# Patient Record
Sex: FEMALE | URBAN - METROPOLITAN AREA
[De-identification: names, ages, dates, MRNs, and addresses within clinical notes are randomized per-mention and may not be internally consistent; named-entity substitution may affect disease eponyms.]

---

## 2022-06-13 ENCOUNTER — EMPLOYEE HEALTH (OUTPATIENT)
Dept: OTHER | Facility: HOSPITAL | Age: 32
End: 2022-06-13
Attending: PREVENTIVE MEDICINE

## 2022-06-13 DIAGNOSIS — Z11.1 SCREENING-PULMONARY TB: Primary | ICD-10-CM

## 2022-06-13 PROCEDURE — 86480 TB TEST CELL IMMUN MEASURE: CPT

## 2022-06-15 LAB
M TB IFN-G CD4+ T-CELLS BLD-ACNC: 0.02 IU/ML
M TB TUBERC IFN-G BLD QL: NEGATIVE
M TB TUBERC IGNF/MITOGEN IGNF CONTROL: >10 IU/ML
QFT TB1 AG MINUS NIL: 0 IU/ML
QFT TB2 AG MINUS NIL: 0.01 IU/ML

## 2022-10-14 ENCOUNTER — OFFICE VISIT (OUTPATIENT)
Dept: OTHER | Facility: HOSPITAL | Age: 32
End: 2022-10-14
Attending: PREVENTIVE MEDICINE
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
## 2022-10-14 LAB — HCV AB SERPL QL IA: NONREACTIVE

## 2022-10-14 PROCEDURE — 86803 HEPATITIS C AB TEST: CPT

## 2022-10-14 PROCEDURE — 87389 HIV-1 AG W/HIV-1&-2 AB AG IA: CPT

## 2022-12-01 ENCOUNTER — OFFICE VISIT (OUTPATIENT)
Dept: OTHER | Facility: HOSPITAL | Age: 32
End: 2022-12-01
Attending: PREVENTIVE MEDICINE

## 2022-12-01 DIAGNOSIS — Z77.21 EXPOSURE TO BLOOD-BORNE PATHOGEN: Primary | ICD-10-CM

## 2022-12-01 PROCEDURE — 87389 HIV-1 AG W/HIV-1&-2 AB AG IA: CPT

## 2022-12-12 ENCOUNTER — OFFICE VISIT (OUTPATIENT)
Dept: FAMILY MEDICINE CLINIC | Facility: CLINIC | Age: 32
End: 2022-12-12
Payer: COMMERCIAL

## 2022-12-12 VITALS
HEART RATE: 76 BPM | TEMPERATURE: 98 F | HEIGHT: 61 IN | WEIGHT: 125.38 LBS | SYSTOLIC BLOOD PRESSURE: 112 MMHG | RESPIRATION RATE: 14 BRPM | BODY MASS INDEX: 23.67 KG/M2 | DIASTOLIC BLOOD PRESSURE: 78 MMHG

## 2022-12-12 DIAGNOSIS — Z13.89 SCREENING FOR GENITOURINARY CONDITION: ICD-10-CM

## 2022-12-12 DIAGNOSIS — R22.31 LUMP IN ARMPIT, RIGHT: ICD-10-CM

## 2022-12-12 DIAGNOSIS — Z00.00 PHYSICAL EXAM, ANNUAL: Primary | ICD-10-CM

## 2022-12-12 DIAGNOSIS — Z00.00 LABORATORY EXAMINATION ORDERED AS PART OF A ROUTINE GENERAL MEDICAL EXAMINATION: ICD-10-CM

## 2022-12-12 PROCEDURE — 99385 PREV VISIT NEW AGE 18-39: CPT | Performed by: FAMILY MEDICINE

## 2022-12-12 PROCEDURE — 3078F DIAST BP <80 MM HG: CPT | Performed by: FAMILY MEDICINE

## 2022-12-12 PROCEDURE — 3074F SYST BP LT 130 MM HG: CPT | Performed by: FAMILY MEDICINE

## 2022-12-12 PROCEDURE — 3008F BODY MASS INDEX DOCD: CPT | Performed by: FAMILY MEDICINE

## 2022-12-12 NOTE — PATIENT INSTRUCTIONS
Schedule ultrasound of the armpit. Call [phone] to schedule fasting blood work. Healthy diet. Stay active. Forward  your Pap smear results and immunization records.

## 2022-12-20 ENCOUNTER — LAB ENCOUNTER (OUTPATIENT)
Dept: LAB | Age: 32
End: 2022-12-20
Attending: FAMILY MEDICINE
Payer: COMMERCIAL

## 2022-12-20 DIAGNOSIS — Z13.89 SCREENING FOR GENITOURINARY CONDITION: ICD-10-CM

## 2022-12-20 DIAGNOSIS — Z00.00 LABORATORY EXAMINATION ORDERED AS PART OF A ROUTINE GENERAL MEDICAL EXAMINATION: ICD-10-CM

## 2022-12-20 LAB
ALBUMIN SERPL-MCNC: 3.9 G/DL (ref 3.4–5)
ALBUMIN/GLOB SERPL: 1.1 {RATIO} (ref 1–2)
ALP LIVER SERPL-CCNC: 67 U/L
ALT SERPL-CCNC: 16 U/L
ANION GAP SERPL CALC-SCNC: 4 MMOL/L (ref 0–18)
AST SERPL-CCNC: 10 U/L (ref 15–37)
BASOPHILS # BLD AUTO: 0.04 X10(3) UL (ref 0–0.2)
BASOPHILS NFR BLD AUTO: 0.3 %
BILIRUB SERPL-MCNC: 0.4 MG/DL (ref 0.1–2)
BILIRUB UR QL: NEGATIVE
BUN BLD-MCNC: 13 MG/DL (ref 7–18)
BUN/CREAT SERPL: 21.7 (ref 10–20)
CALCIUM BLD-MCNC: 8.7 MG/DL (ref 8.5–10.1)
CHLORIDE SERPL-SCNC: 109 MMOL/L (ref 98–112)
CHOLEST SERPL-MCNC: 206 MG/DL (ref ?–200)
CLARITY UR: CLEAR
CO2 SERPL-SCNC: 26 MMOL/L (ref 21–32)
COLOR UR: YELLOW
CREAT BLD-MCNC: 0.6 MG/DL
DEPRECATED RDW RBC AUTO: 41.7 FL (ref 35.1–46.3)
EOSINOPHIL # BLD AUTO: 0.08 X10(3) UL (ref 0–0.7)
EOSINOPHIL NFR BLD AUTO: 0.6 %
ERYTHROCYTE [DISTWIDTH] IN BLOOD BY AUTOMATED COUNT: 13.4 % (ref 11–15)
FASTING PATIENT LIPID ANSWER: YES
FASTING STATUS PATIENT QL REPORTED: YES
GFR SERPLBLD BASED ON 1.73 SQ M-ARVRAT: 121 ML/MIN/1.73M2 (ref 60–?)
GLOBULIN PLAS-MCNC: 3.6 G/DL (ref 2.8–4.4)
GLUCOSE BLD-MCNC: 88 MG/DL (ref 70–99)
GLUCOSE UR-MCNC: NEGATIVE MG/DL
HCT VFR BLD AUTO: 42.2 %
HDLC SERPL-MCNC: 52 MG/DL (ref 40–59)
HGB BLD-MCNC: 12.9 G/DL
IMM GRANULOCYTES # BLD AUTO: 0.06 X10(3) UL (ref 0–1)
IMM GRANULOCYTES NFR BLD: 0.5 %
KETONES UR-MCNC: 15 MG/DL
LDLC SERPL CALC-MCNC: 139 MG/DL (ref ?–100)
LEUKOCYTE ESTERASE UR QL STRIP.AUTO: NEGATIVE
LYMPHOCYTES # BLD AUTO: 1.56 X10(3) UL (ref 1–4)
LYMPHOCYTES NFR BLD AUTO: 12.4 %
MCH RBC QN AUTO: 26.4 PG (ref 26–34)
MCHC RBC AUTO-ENTMCNC: 30.6 G/DL (ref 31–37)
MCV RBC AUTO: 86.5 FL
MONOCYTES # BLD AUTO: 0.66 X10(3) UL (ref 0.1–1)
MONOCYTES NFR BLD AUTO: 5.2 %
NEUTROPHILS # BLD AUTO: 10.2 X10 (3) UL (ref 1.5–7.7)
NEUTROPHILS # BLD AUTO: 10.2 X10(3) UL (ref 1.5–7.7)
NEUTROPHILS NFR BLD AUTO: 81 %
NITRITE UR QL STRIP.AUTO: NEGATIVE
NONHDLC SERPL-MCNC: 154 MG/DL (ref ?–130)
OSMOLALITY SERPL CALC.SUM OF ELEC: 288 MOSM/KG (ref 275–295)
PH UR: 5 [PH] (ref 5–8)
PLATELET # BLD AUTO: 216 10(3)UL (ref 150–450)
POTASSIUM SERPL-SCNC: 4.5 MMOL/L (ref 3.5–5.1)
PROT SERPL-MCNC: 7.5 G/DL (ref 6.4–8.2)
PROT UR-MCNC: NEGATIVE MG/DL
RBC # BLD AUTO: 4.88 X10(6)UL
SODIUM SERPL-SCNC: 139 MMOL/L (ref 136–145)
SP GR UR STRIP: 1.02 (ref 1–1.03)
TRIGL SERPL-MCNC: 86 MG/DL (ref 30–149)
TSI SER-ACNC: 1.13 MIU/ML (ref 0.36–3.74)
UROBILINOGEN UR STRIP-ACNC: 0.2
VLDLC SERPL CALC-MCNC: 16 MG/DL (ref 0–30)
WBC # BLD AUTO: 12.6 X10(3) UL (ref 4–11)

## 2022-12-20 PROCEDURE — 80053 COMPREHEN METABOLIC PANEL: CPT

## 2022-12-20 PROCEDURE — 84443 ASSAY THYROID STIM HORMONE: CPT

## 2022-12-20 PROCEDURE — 80061 LIPID PANEL: CPT

## 2022-12-20 PROCEDURE — 81001 URINALYSIS AUTO W/SCOPE: CPT

## 2022-12-20 PROCEDURE — 85025 COMPLETE CBC W/AUTO DIFF WBC: CPT

## 2022-12-20 PROCEDURE — 81015 MICROSCOPIC EXAM OF URINE: CPT

## 2022-12-21 DIAGNOSIS — D72.829 LEUKOCYTOSIS, UNSPECIFIED TYPE: Primary | ICD-10-CM

## 2022-12-21 DIAGNOSIS — R31.9 HEMATURIA, UNSPECIFIED TYPE: Primary | ICD-10-CM

## 2022-12-29 ENCOUNTER — TELEPHONE (OUTPATIENT)
Dept: FAMILY MEDICINE CLINIC | Facility: CLINIC | Age: 32
End: 2022-12-29

## 2023-01-05 ENCOUNTER — PATIENT MESSAGE (OUTPATIENT)
Dept: FAMILY MEDICINE CLINIC | Facility: CLINIC | Age: 33
End: 2023-01-05

## 2023-01-05 DIAGNOSIS — R22.31 LUMP IN ARMPIT, RIGHT: Primary | ICD-10-CM

## 2023-01-06 NOTE — TELEPHONE ENCOUNTER
From: Stacy Monsalve  To: Davon Renteria MD  Sent: 1/5/2023 10:07 AM CST  Subject: [address] Dr Marija Wayne! I called to schedule for an Ultrasound armpit but I was told by the clinic that I will have to do Diagnostic Mammogram and Breast Ultrasound instead due to the location of the nodule that I felt on my right armpit. May I please request for a new order of Diagnostic Mammogram and Ultrasound breast (RIGHT) so I can proceed to make the appointment? Thank you!      April

## 2023-01-12 ENCOUNTER — LAB ENCOUNTER (OUTPATIENT)
Dept: LAB | Age: 33
End: 2023-01-12
Attending: FAMILY MEDICINE
Payer: COMMERCIAL

## 2023-01-12 DIAGNOSIS — R31.21 ASYMPTOMATIC MICROSCOPIC HEMATURIA: Primary | ICD-10-CM

## 2023-01-12 DIAGNOSIS — D72.829 LEUKOCYTOSIS, UNSPECIFIED TYPE: ICD-10-CM

## 2023-01-12 DIAGNOSIS — R31.9 HEMATURIA, UNSPECIFIED TYPE: ICD-10-CM

## 2023-01-12 LAB
BASOPHILS # BLD AUTO: 0.02 X10(3) UL (ref 0–0.2)
BASOPHILS NFR BLD AUTO: 0.3 %
BILIRUB UR QL: NEGATIVE
CLARITY UR: CLEAR
COLOR UR: YELLOW
DEPRECATED RDW RBC AUTO: 40.1 FL (ref 35.1–46.3)
EOSINOPHIL # BLD AUTO: 0.08 X10(3) UL (ref 0–0.7)
EOSINOPHIL NFR BLD AUTO: 1 %
ERYTHROCYTE [DISTWIDTH] IN BLOOD BY AUTOMATED COUNT: 13.1 % (ref 11–15)
GLUCOSE UR-MCNC: NEGATIVE MG/DL
HCT VFR BLD AUTO: 42.5 %
HGB BLD-MCNC: 13.6 G/DL
HYALINE CASTS #/AREA URNS AUTO: PRESENT /LPF
HYALINE CASTS #/AREA URNS AUTO: PRESENT /LPF
IMM GRANULOCYTES # BLD AUTO: 0.03 X10(3) UL (ref 0–1)
IMM GRANULOCYTES NFR BLD: 0.4 %
KETONES UR-MCNC: 15 MG/DL
LEUKOCYTE ESTERASE UR QL STRIP.AUTO: NEGATIVE
LYMPHOCYTES # BLD AUTO: 1.8 X10(3) UL (ref 1–4)
LYMPHOCYTES NFR BLD AUTO: 23.5 %
MCH RBC QN AUTO: 26.6 PG (ref 26–34)
MCHC RBC AUTO-ENTMCNC: 32 G/DL (ref 31–37)
MCV RBC AUTO: 83.2 FL
MONOCYTES # BLD AUTO: 0.49 X10(3) UL (ref 0.1–1)
MONOCYTES NFR BLD AUTO: 6.4 %
NEUTROPHILS # BLD AUTO: 5.23 X10 (3) UL (ref 1.5–7.7)
NEUTROPHILS # BLD AUTO: 5.23 X10(3) UL (ref 1.5–7.7)
NEUTROPHILS NFR BLD AUTO: 68.4 %
NITRITE UR QL STRIP.AUTO: NEGATIVE
PH UR: 5.5 [PH] (ref 5–8)
PLATELET # BLD AUTO: 361 10(3)UL (ref 150–450)
PROT UR-MCNC: NEGATIVE MG/DL
RBC # BLD AUTO: 5.11 X10(6)UL
SP GR UR STRIP: 1.01 (ref 1–1.03)
UROBILINOGEN UR STRIP-ACNC: 0.2
WBC # BLD AUTO: 7.7 X10(3) UL (ref 4–11)

## 2023-01-12 PROCEDURE — 85025 COMPLETE CBC W/AUTO DIFF WBC: CPT

## 2023-01-12 PROCEDURE — 81015 MICROSCOPIC EXAM OF URINE: CPT

## 2023-01-12 PROCEDURE — 81001 URINALYSIS AUTO W/SCOPE: CPT

## 2023-01-19 ENCOUNTER — OFFICE VISIT (OUTPATIENT)
Dept: OTHER | Facility: HOSPITAL | Age: 33
End: 2023-01-19
Attending: PREVENTIVE MEDICINE

## 2023-01-19 DIAGNOSIS — Z02.89 VISIT FOR OCCUPATIONAL HEALTH EXAMINATION: Primary | ICD-10-CM

## 2023-01-19 LAB — HCV AB SERPL QL IA: NONREACTIVE

## 2023-01-19 PROCEDURE — 86803 HEPATITIS C AB TEST: CPT

## 2023-01-20 ENCOUNTER — PATIENT MESSAGE (OUTPATIENT)
Dept: FAMILY MEDICINE CLINIC | Facility: CLINIC | Age: 33
End: 2023-01-20

## 2023-01-20 NOTE — TELEPHONE ENCOUNTER
From: Stacy Zheng Apo  To: Mert Osorio MD  Sent: 1/20/2023 8:12 AM CST  Subject: Prescribe Medication     Hi Dr Santy Rosado! Im just wondering if you will be able to prescribe me an Isotretenoin Medication? I used to take this medication to control my acne from my Derm Doctor from [facility]. But since I moved here in [facility], I have not seen a dermatologist yet and will only have my 1st appt this April 5th. Will you be able to prescribe for me while waiting for my derm appointment? I take Oratane for 5mg every other day. If not, i would totally understand.      Thank you Dr!     April

## 2023-02-21 ENCOUNTER — TELEPHONE (OUTPATIENT)
Dept: FAMILY MEDICINE CLINIC | Facility: CLINIC | Age: 33
End: 2023-02-21

## 2023-02-21 ENCOUNTER — MED REC SCAN ONLY (OUTPATIENT)
Dept: FAMILY MEDICINE CLINIC | Facility: CLINIC | Age: 33
End: 2023-02-21

## 2023-02-21 NOTE — TELEPHONE ENCOUNTER
RAFFAELE. to pt. I notified her of the results of the US of the right axilla. Pt was instructed to do self exams every couple of weeks. Make an appt to follow up in June/July.  Pt. Agreed to plan and verbalized understanding

## 2023-02-21 NOTE — TELEPHONE ENCOUNTER
I have received patient ultrasound of the right axilla results back. It shows normal-appearing lymph node. Continue monitoring  by self-examination once every couple of weeks. We will recheck it again at patient's next visit in the office. I would suggest to set up follow-up appointment in June/July  . Thanks.     (. Results came back as a paper copy from [address] [place] [address].)

## 2023-02-24 ENCOUNTER — OFFICE VISIT (OUTPATIENT)
Dept: OCCUPATIONAL MEDICINE | Age: 33
End: 2023-02-24
Attending: PHYSICIAN ASSISTANT

## 2023-02-24 DIAGNOSIS — Z77.21 EXPOSURE TO BLOOD-BORNE PATHOGEN: Primary | ICD-10-CM

## 2023-02-24 PROCEDURE — 87389 HIV-1 AG W/HIV-1&-2 AB AG IA: CPT

## 2023-08-29 ENCOUNTER — OFFICE VISIT (OUTPATIENT)
Dept: OTHER | Facility: HOSPITAL | Age: 33
End: 2023-08-29
Attending: PREVENTIVE MEDICINE

## 2023-08-29 DIAGNOSIS — Z02.89 VISIT FOR OCCUPATIONAL HEALTH EXAMINATION: Primary | ICD-10-CM

## 2023-08-29 LAB
HBV SURFACE AB SER QL: NONREACTIVE
HBV SURFACE AB SERPL IA-ACNC: <3.1 MIU/ML
HCV AB SERPL QL IA: NONREACTIVE

## 2023-08-29 PROCEDURE — 86706 HEP B SURFACE ANTIBODY: CPT

## 2023-08-29 PROCEDURE — 87389 HIV-1 AG W/HIV-1&-2 AB AG IA: CPT

## 2023-08-29 PROCEDURE — 86803 HEPATITIS C AB TEST: CPT

## 2023-09-27 ENCOUNTER — OFFICE VISIT (OUTPATIENT)
Dept: OBGYN CLINIC | Facility: CLINIC | Age: 33
End: 2023-09-27
Payer: COMMERCIAL

## 2023-09-27 VITALS
HEIGHT: 61 IN | SYSTOLIC BLOOD PRESSURE: 133 MMHG | DIASTOLIC BLOOD PRESSURE: 83 MMHG | HEART RATE: 78 BPM | BODY MASS INDEX: 23.98 KG/M2 | WEIGHT: 127 LBS

## 2023-09-27 DIAGNOSIS — Z01.419 ENCOUNTER FOR ANNUAL ROUTINE GYNECOLOGICAL EXAMINATION: Primary | ICD-10-CM

## 2023-09-27 DIAGNOSIS — N92.1 PROLONGED MENSTRUAL CYCLE: ICD-10-CM

## 2023-09-27 PROCEDURE — 88175 CYTOPATH C/V AUTO FLUID REDO: CPT | Performed by: STUDENT IN AN ORGANIZED HEALTH CARE EDUCATION/TRAINING PROGRAM

## 2023-09-27 PROCEDURE — 87624 HPV HI-RISK TYP POOLED RSLT: CPT | Performed by: STUDENT IN AN ORGANIZED HEALTH CARE EDUCATION/TRAINING PROGRAM

## 2023-09-27 PROCEDURE — 99385 PREV VISIT NEW AGE 18-39: CPT | Performed by: STUDENT IN AN ORGANIZED HEALTH CARE EDUCATION/TRAINING PROGRAM

## 2023-09-27 PROCEDURE — 3008F BODY MASS INDEX DOCD: CPT | Performed by: STUDENT IN AN ORGANIZED HEALTH CARE EDUCATION/TRAINING PROGRAM

## 2023-09-27 PROCEDURE — 3075F SYST BP GE 130 - 139MM HG: CPT | Performed by: STUDENT IN AN ORGANIZED HEALTH CARE EDUCATION/TRAINING PROGRAM

## 2023-09-27 PROCEDURE — 3079F DIAST BP 80-89 MM HG: CPT | Performed by: STUDENT IN AN ORGANIZED HEALTH CARE EDUCATION/TRAINING PROGRAM

## 2023-09-28 LAB — HPV I/H RISK 1 DNA SPEC QL NAA+PROBE: NEGATIVE

## 2023-10-27 ENCOUNTER — TELEPHONE (OUTPATIENT)
Dept: FAMILY MEDICINE CLINIC | Facility: CLINIC | Age: 33
End: 2023-10-27

## 2023-10-27 DIAGNOSIS — Z13.89 SCREENING FOR GENITOURINARY CONDITION: ICD-10-CM

## 2023-10-27 DIAGNOSIS — Z00.00 LABORATORY EXAMINATION ORDERED AS PART OF A ROUTINE GENERAL MEDICAL EXAMINATION: Primary | ICD-10-CM

## 2023-10-31 ENCOUNTER — TELEPHONE (OUTPATIENT)
Dept: ALLERGY | Facility: CLINIC | Age: 33
End: 2023-10-31

## 2023-10-31 NOTE — TELEPHONE ENCOUNTER
Patient has an appointment on November 18, 2023 at 8 AM.  Reason for visit is chronic urticaria and considering Xolair as recommended by her dermatologist.  Please contact patient and inform her that her dermatologist  should be able to provide Xolair as a treatment option if she is already established care with a dermatologist.

## 2023-11-18 ENCOUNTER — OFFICE VISIT (OUTPATIENT)
Dept: ALLERGY | Facility: CLINIC | Age: 33
End: 2023-11-18
Payer: COMMERCIAL

## 2023-11-18 ENCOUNTER — TELEPHONE (OUTPATIENT)
Dept: ALLERGY | Facility: CLINIC | Age: 33
End: 2023-11-18

## 2023-11-18 VITALS
HEIGHT: 61 IN | OXYGEN SATURATION: 100 % | WEIGHT: 125 LBS | BODY MASS INDEX: 23.6 KG/M2 | SYSTOLIC BLOOD PRESSURE: 143 MMHG | HEART RATE: 69 BPM | DIASTOLIC BLOOD PRESSURE: 91 MMHG

## 2023-11-18 DIAGNOSIS — L50.1 CHRONIC IDIOPATHIC URTICARIA: Primary | ICD-10-CM

## 2023-11-18 DIAGNOSIS — Z23 FLU VACCINE NEED: ICD-10-CM

## 2023-11-18 DIAGNOSIS — Z92.29 COVID-19 VACCINE SERIES COMPLETED: ICD-10-CM

## 2023-11-18 PROCEDURE — 3077F SYST BP >= 140 MM HG: CPT | Performed by: ALLERGY & IMMUNOLOGY

## 2023-11-18 PROCEDURE — 3080F DIAST BP >= 90 MM HG: CPT | Performed by: ALLERGY & IMMUNOLOGY

## 2023-11-18 PROCEDURE — 99244 OFF/OP CNSLTJ NEW/EST MOD 40: CPT | Performed by: ALLERGY & IMMUNOLOGY

## 2023-11-18 PROCEDURE — 3008F BODY MASS INDEX DOCD: CPT | Performed by: ALLERGY & IMMUNOLOGY

## 2023-11-18 RX ORDER — LEVOCETIRIZINE DIHYDROCHLORIDE 5 MG/1
5 TABLET, FILM COATED ORAL 2 TIMES DAILY
Qty: 180 TABLET | Refills: 1 | Status: SHIPPED | OUTPATIENT
Start: 2023-11-18

## 2023-11-18 NOTE — TELEPHONE ENCOUNTER
Xolair application received for CIU. Application completed. Patient works for [employer], and will have the insurance change to [facility] at 1-1-24. Should we wait for the first of the year to process the application? Patient advised we will let her know after we speak to [facility]. Pt agreeable to plan of care.

## 2023-11-18 NOTE — PATIENT INSTRUCTIONS
#1 chronic idiopathic urticaria  10+ year history. See above clinical history. Has tried Claritin Zyrtec Singulair in the past.  No prior Xyzal.  Denies associated angioedema lip swelling or respiratory issues. Frequency of hives approximately every week    Handouts on chronic urticaria provided and reviewed including majority being idiopathic in nature  Denies atopic tendencies including asthma eczema food allergies or environmental allergies  Prior labs from over the past including CBC CMP TSH were normal  Trial of Xyzal, levocetirizine 5 mg once a day up to 4 times per day if needed  Application for Xolair provided. Reviewed Xolair efficacy  Will consider Xolair if refractory to Xyzal    #2 flu vaccine up-to-date through work. Patient works at [employer]    #3 [place] vaccines reviewed. Recommend booster this fall. Last COVID-vaccine was in follow-up 2021. Reviewed new booster available this past month. I do not have the COVID-vaccine in my office         Orders This Visit:  No orders of the defined types were placed in this encounter. Meds This Visit:  Requested Prescriptions     Signed Prescriptions Disp Refills    levocetirizine 5 MG Oral Tab 180 tablet 1     Sig: Take 1 tablet (5 mg total) by mouth in the morning and 1 tablet (5 mg total) before bedtime.

## 2023-11-18 NOTE — PROGRESS NOTES
Lou Davis is a 26year old female. HPI:     Chief Complaint   Patient presents with    Consult     Chronic Urticaria        Patient is a 64-year-old female who presents for allergy consultation upon referral of her dermatologist  with a chief complaint of chronic hives  Patient with prior dermatology evaluation at [facility] dermatology  Patient reports [facility] dermatology is out of network and Xolair treatment was previously discussed with her by her dermatologist.  Patient looking to seek treatment here    Rash:   Duration:  10 years   Freq: every week per pt  Picture suggest hives   Symptoms: Hive-like rash red raised itchy  Less than 24 hours typically  Denies fever joint pain joint swelling burning bruising or pigment changes  Location: Can be all over  Frequency: Intermittent  Severity: Moderate  Denies oral swelling or respiratory issues  Status:   No change  Tried:  claritin, singulair, zyrtec 2x/day  No prior xyzal   Triggers:  ?? Preceding fever, infection, bite, sting, antibiotics, NSAID or new medication: Denies  History of physical triggers:   Denies    Immunization records reviewed. 3 prior COVID vaccines last in October 2021  No flu vaccine on record. Patient does work at [employer]. Reports flu vaccine through work    PCP has ordered TSH CBC CMP on October 28, 2023. Yet to be completed by patient    Labs from 2023 including HIV hepatitis C antibody negative, CBC normal  TSH in December 2022 was normal 1.13    Hx of asthma , ar, ad, food denies      MA at [facility] on ortho at [address]:  History reviewed. No pertinent past medical history. History reviewed. No pertinent surgical history.    Family History   Problem Relation Age of Onset    Other (gout) Father     Other (Other) Father     Hypertension Father     Other (chronic kidney disease) Mother     Other (Other) Mother     Hypertension Mother     Heart Disorder Maternal Grandmother     Diabetes Maternal Grandmother     Heart Disorder Maternal Grandfather     Other (Alzheimers) Paternal Grandmother     Other (gout) Paternal Grandfather     Other (gout) Brother       Social History:   Social History     Socioeconomic History    Marital status:    Tobacco Use    Smoking status: Never    Smokeless tobacco: Never   Vaping Use    Vaping Use: Never used   Substance and Sexual Activity    Alcohol use: Yes    Drug use: Never    Sexual activity: Yes     Partners: Female        Medications (Active prior to today's visit):  Current Outpatient Medications   Medication Sig Dispense Refill    levocetirizine 5 MG Oral Tab Take 1 tablet (5 mg total) by mouth in the morning and 1 tablet (5 mg total) before bedtime.  180 tablet 1       Allergies:  No Known Allergies      ROS:     Allergic/Immuno:  See HPI  Cardiovascular:  Negative for irregular heartbeat/palpitations, chest pain, edema  Constitutional:  Negative night sweats,weight loss, irritability and lethargy  Endocrine:  Negative for cold intolerance, polydipsia and polyphagia  ENMT:  Negative for ear drainage, hearing loss and nasal drainage  Eyes:  Negative for eye discharge and vision loss  Gastrointestinal:  Negative for abdominal pain, diarrhea and vomiting  Genitourinary:  Negative for dysuria and hematuria  Hema/Lymph:  Negative for easy bleeding and easy bruising  Integumentary:   see hpi   Musculoskeletal:  Negative for joint symptoms  Neurological:  Negative for dizziness, seizures  Psychiatric:  Negative for inappropriate interaction and psychiatric symptoms  Respiratory:  Negative for cough, dyspnea and wheezing      PHYSICAL EXAM:   Constitutional: responsive, no acute distress noted  Head/Face: NC/Atraumatic  Eyes/Vision: conjunctiva and lids are normal extraocular motion is intact   Ears/Audiometry: tympanic membranes are normal bilaterally hearing is grossly intact  Nose/Mouth/Throat: nose and throat are clear mucous membranes are moist   Neck/Thyroid: neck is supple without adenopathy  Lymphatic: no abnormal cervical, supraclavicular or axillary adenopathy is noted  Respiratory: normal to inspection lungs are clear to auscultation bilaterally normal respiratory effort   Cardiovascular: regular rate and rhythm no murmurs, gallups, or rubs  Abdomen: soft non-tender non-distended  Skin/Hair: no unusual rashes present  Extremities: no edema, cyanosis, or clubbing  Neurological:Oriented to time, place, person & situation       ASSESSMENT/PLAN:   Assessment   Encounter Diagnoses   Name Primary? Chronic idiopathic urticaria Yes    Flu vaccine need     COVID-19 vaccine series completed        #1 chronic idiopathic urticaria  10+ year history. See above clinical history. Has tried Claritin Zyrtec Singulair in the past.  No prior Xyzal.  Denies associated angioedema lip swelling or respiratory issues. Frequency of hives approximately every week    Handouts on chronic urticaria provided and reviewed including majority being idiopathic in nature  Denies atopic tendencies including asthma eczema food allergies or environmental allergies  Prior labs from over the past including CBC CMP TSH were normal  Trial of Xyzal, levocetirizine 5 mg once a day up to 4 times per day if needed  Application for Xolair provided. Reviewed Xolair efficacy  Will consider Xolair if refractory to Xyzal    #2 flu vaccine up-to-date through work. Patient works at [employer]    #3 [employer] vaccines reviewed. Recommend booster this fall. Last COVID-vaccine was in follow-up 2021. Reviewed new booster available this past month. I do not have the COVID-vaccine in my office         Orders This Visit:  No orders of the defined types were placed in this encounter. Meds This Visit:  Requested Prescriptions     Signed Prescriptions Disp Refills    levocetirizine 5 MG Oral Tab 180 tablet 1     Sig: Take 1 tablet (5 mg total) by mouth in the morning and 1 tablet (5 mg total) before bedtime.        Imaging & Referrals:  None 11/18/2023  Sofia Ortiz MD      If medication samples were provided today, they were provided solely for patient education and training related to self administration of these medications. Teaching, instruction and sample was provided to the patient by myself. Teaching included  a review of potential adverse side effects as well as potential efficacy. Patient's questions were answered in regards to medication administration and dosing and potential side effects.  Teaching was provided via the teach back method

## 2023-11-21 NOTE — TELEPHONE ENCOUNTER
Await patient to read [employer] message as sent below. . Tera Christian,     Thank you for completing the [employer] [facility] [employer] Service Form and Consent Form during your recent visit with Dr. Alexandre Joya. We have two options to proceed. I can complete the forms and send then into [facility] now. I may be able to have Xolair approved by [employer] for 12/2023. The alternative, due to your insurance coverage changing as of 1/1/2024, is to wait until after the first of the year to request a benefit investigation and prior authorization for Xolair. I am willing to proceed based on your preference. Please let me know how you would like to proceed.          Tanmay Garrett., TASNEEM

## 2023-11-27 NOTE — TELEPHONE ENCOUNTER
Completed and signed by Dr. Jared Pedro and Patient Consent form faxed to [address] [place] [address] at [phone]. Await fax confirmation.

## 2023-12-08 ENCOUNTER — TELEPHONE (OUTPATIENT)
Dept: ALLERGY | Facility: CLINIC | Age: 33
End: 2023-12-08

## 2023-12-12 NOTE — TELEPHONE ENCOUNTER
[facility] [employer] contacted at [phone]. RN spoke with Care Coordinator, Zo Pineda. Reported to Zo Pineda that patient's Xolair prescription has been sent to [facility], no need for [facility] to send prescription. Zo Pineda reports that [employer] will monitor status of verification of Xolair PA by [facility] and will send fax to physician's office when above is completed and with any other updates available.

## 2023-12-26 ENCOUNTER — NURSE ONLY (OUTPATIENT)
Dept: ALLERGY | Facility: CLINIC | Age: 33
End: 2023-12-26
Payer: COMMERCIAL

## 2023-12-26 ENCOUNTER — TELEPHONE (OUTPATIENT)
Dept: ALLERGY | Facility: CLINIC | Age: 33
End: 2023-12-26

## 2023-12-26 VITALS
HEART RATE: 83 BPM | TEMPERATURE: 99 F | DIASTOLIC BLOOD PRESSURE: 85 MMHG | SYSTOLIC BLOOD PRESSURE: 125 MMHG | OXYGEN SATURATION: 98 % | RESPIRATION RATE: 20 BRPM

## 2023-12-26 DIAGNOSIS — L50.1 CHRONIC IDIOPATHIC URTICARIA: Primary | ICD-10-CM

## 2023-12-26 PROCEDURE — 96372 THER/PROPH/DIAG INJ SC/IM: CPT | Performed by: ALLERGY & IMMUNOLOGY

## 2023-12-26 PROCEDURE — 3079F DIAST BP 80-89 MM HG: CPT | Performed by: ALLERGY & IMMUNOLOGY

## 2023-12-26 PROCEDURE — 3074F SYST BP LT 130 MM HG: CPT | Performed by: ALLERGY & IMMUNOLOGY

## 2023-12-26 RX ORDER — TRIAMCINOLONE ACETONIDE 1 MG/G
CREAM TOPICAL
COMMUNITY
Start: 2023-08-17

## 2023-12-26 RX ORDER — EPINEPHRINE 0.3 MG/.3ML
INJECTION SUBCUTANEOUS
COMMUNITY
Start: 2023-12-21

## 2023-12-26 NOTE — PROGRESS NOTES
Per standing order patient to begin Xolair injections every 28 days- 300  mg. Patient verified name, , and injection to be given. Xolair 150 mg/mL prefilled syringe x2  NDC#: 32898-226-67  Lot #: [id number]  Exp Date: 10/2024    A 1315 Xolair 150 mg/mL prefilled syringe administered subcutaneously to Right and Left Upper Arm. Patient tolerated injection with no adverse side effects noted at time of administration. ? Patient monitored x 120 min after administration. At [phone], patient released from office without any sign/symptoms of local or systemic reaction. Pre PF: (3 readings) for asthma diagnosis  Post PF: (3 readings)  ?   Patient scheduled next Biologic Injection Appointment for 2024 at 1500

## 2023-12-26 NOTE — TELEPHONE ENCOUNTER
May I please have an in-clinic order for Xolair 150 mg/mL prefilled syringe, 300 mg every 28 days.     Dx: L50.1

## 2024-01-03 ENCOUNTER — PATIENT MESSAGE (OUTPATIENT)
Dept: ALLERGY | Facility: CLINIC | Age: 34
End: 2024-01-03

## 2024-01-03 DIAGNOSIS — L50.1 CHRONIC IDIOPATHIC URTICARIA: ICD-10-CM

## 2024-01-04 NOTE — TELEPHONE ENCOUNTER
See Our Security Team message sent to patient.      You  Stacy SalvadorLaly now (8:29 AM)     HP  Triston April,     Yes, a new prior authorization for Xolair will need to be submitted through [employer] in order for you to receive Xolair.      You should not need to complete a new [employer] [facility] [employer] copayment enrollment form.      I will need to submit your new insurance information to [employer] in order to have a new benefit investigation completed.      Once the benefit investigation is completed, I will be able to request the prior authorization through [employer].      I do not have your new insurance information.  If you are able to download the [employer] Card through Our Security Team please do so.      If you are not able to download the insurance information, please send a picture of the front and back of the new insurance card.      Regards,     Shannon GHOTRA RN              From: April Kellen Salvador  To: Daniel Paul  Sent: 1/3/2024  5:10 PM CST  Subject: Xolair Pre Authorization (New Insurance)    Brett Ortega!     I just want to find out if I am okay to go ahead with my 2nd injection appointment on 01/16/2024? Do I have to wait for another pre-authorization since I will no longer be covered under [employer] [facility]? I am [facility] staff and will be under a new insurance.     Thank you and have a great day!     -April-

## 2024-01-05 NOTE — TELEPHONE ENCOUNTER
[employer] contacted at [phone].     RN spoke with Care Coordinator, Shane.     Reported new [employer] Insurance information.     See attached image to this encounter.     Rep offers that benefit investigation will be run, will take 5 business days for benefit investigation to be completed.

## 2024-01-16 ENCOUNTER — NURSE ONLY (OUTPATIENT)
Dept: ALLERGY | Facility: CLINIC | Age: 34
End: 2024-01-16

## 2024-01-16 DIAGNOSIS — L50.1 CHRONIC IDIOPATHIC URTICARIA: Primary | ICD-10-CM

## 2024-01-16 PROCEDURE — 96372 THER/PROPH/DIAG INJ SC/IM: CPT | Performed by: ALLERGY & IMMUNOLOGY

## 2024-01-16 NOTE — PROGRESS NOTES
Per standing order patient to continue Xolair injections every 28 days- 300  mg.     Patient verified name, , and injection to be given.     At 1500, Xolair 300 mg administered subcutaneously to bilateral upper arms . 150 mg administered to left upper arm. 150 mg administered to right upper arm  RN used sample medication. Patient denied education.  Xolair 150 mg/mL prefilled syringe x 2  Lot #: [id number]  Exp Date: MAY 2024  Patient tolerated injection with no adverse side effects noted at time of administration.         ?  Patient monitored x 30 min after administration.   At 1535, patient released from office without any sign/symptoms of local or systemic reaction.  ?  Patient scheduled next Biologic Injection Appointment for 2024

## 2024-01-18 RX ORDER — OMALIZUMAB 150 MG/ML
300 INJECTION, SOLUTION SUBCUTANEOUS
Qty: 2 ML | Refills: 5 | OUTPATIENT
Start: 2024-01-18 | End: 2024-01-23

## 2024-01-18 NOTE — TELEPHONE ENCOUNTER
Dr. Paul.    PA is not required for Xolair per [facility].     Patient's Xolair Prescription pended for diagnosis of CIU-L50.1.       [facility] sent Rx for Xolair to pharmacy.     Rx below pended as call in.

## 2024-01-18 NOTE — TELEPHONE ENCOUNTER
[facility]'s Benefit Investigation result received.     Covered Specialty Pharmacy:  [facility]    Pharmacy Benefit PA through [employer]:    Call [phone]    Number above contacted.     RN spoke with PA Mariya jackson.    Rep states that patient's account is handled at [phone].     RN called [employer] telephone number above.     RN Spoke with PA , Rebecca.     Attempted to complete PA for Xolair 150 mg/mL prefilled syringe , 300 mg every 28 days.       Rep offers that PA is not required for Xolair through patient's plan.

## 2024-01-23 ENCOUNTER — LAB ENCOUNTER (OUTPATIENT)
Dept: LAB | Age: 34
End: 2024-01-23
Attending: FAMILY MEDICINE
Payer: COMMERCIAL

## 2024-01-23 ENCOUNTER — TELEPHONE (OUTPATIENT)
Dept: ALLERGY | Facility: CLINIC | Age: 34
End: 2024-01-23

## 2024-01-23 DIAGNOSIS — Z13.89 SCREENING FOR GENITOURINARY CONDITION: ICD-10-CM

## 2024-01-23 DIAGNOSIS — D64.9 ANEMIA, UNSPECIFIED TYPE: ICD-10-CM

## 2024-01-23 DIAGNOSIS — D64.9 ANEMIA, UNSPECIFIED TYPE: Primary | ICD-10-CM

## 2024-01-23 DIAGNOSIS — Z00.00 LABORATORY EXAMINATION ORDERED AS PART OF A ROUTINE GENERAL MEDICAL EXAMINATION: ICD-10-CM

## 2024-01-23 LAB
ALBUMIN SERPL-MCNC: 3.8 G/DL (ref 3.4–5)
ALBUMIN/GLOB SERPL: 0.9 {RATIO} (ref 1–2)
ALP LIVER SERPL-CCNC: 64 U/L
ANION GAP SERPL CALC-SCNC: 6 MMOL/L (ref 0–18)
AST SERPL-CCNC: 10 U/L (ref 15–37)
BASOPHILS # BLD AUTO: 0.05 X10(3) UL (ref 0–0.2)
BASOPHILS NFR BLD AUTO: 0.6 %
BILIRUB SERPL-MCNC: 0.3 MG/DL (ref 0.1–2)
BILIRUB UR QL STRIP.AUTO: NEGATIVE
BUN BLD-MCNC: 12 MG/DL (ref 9–23)
CALCIUM BLD-MCNC: 9.2 MG/DL (ref 8.5–10.1)
CHLORIDE SERPL-SCNC: 108 MMOL/L (ref 98–112)
CHOLEST SERPL-MCNC: 205 MG/DL (ref ?–200)
CLARITY UR REFRACT.AUTO: CLEAR
CO2 SERPL-SCNC: 26 MMOL/L (ref 21–32)
COLOR UR AUTO: YELLOW
CREAT BLD-MCNC: 0.72 MG/DL
DEPRECATED HBV CORE AB SER IA-ACNC: 4.8 NG/ML
EGFRCR SERPLBLD CKD-EPI 2021: 112 ML/MIN/1.73M2 (ref 60–?)
EOSINOPHIL # BLD AUTO: 0.09 X10(3) UL (ref 0–0.7)
EOSINOPHIL NFR BLD AUTO: 1.1 %
ERYTHROCYTE [DISTWIDTH] IN BLOOD BY AUTOMATED COUNT: 14.9 %
FASTING PATIENT LIPID ANSWER: YES
FASTING STATUS PATIENT QL REPORTED: YES
GLOBULIN PLAS-MCNC: 4.1 G/DL (ref 2.8–4.4)
GLUCOSE BLD-MCNC: 87 MG/DL (ref 70–99)
GLUCOSE UR STRIP.AUTO-MCNC: NORMAL MG/DL
HCT VFR BLD AUTO: 37.6 %
HDLC SERPL-MCNC: 52 MG/DL (ref 40–59)
HGB BLD-MCNC: 10.8 G/DL
IMM GRANULOCYTES # BLD AUTO: 0.03 X10(3) UL (ref 0–1)
IMM GRANULOCYTES NFR BLD: 0.4 %
IRON SATN MFR SERPL: 4 %
IRON SERPL-MCNC: 24 UG/DL
KETONES UR STRIP.AUTO-MCNC: NEGATIVE MG/DL
LDLC SERPL CALC-MCNC: 133 MG/DL (ref ?–100)
LEUKOCYTE ESTERASE UR QL STRIP.AUTO: 75
LYMPHOCYTES # BLD AUTO: 1.65 X10(3) UL (ref 1–4)
LYMPHOCYTES NFR BLD AUTO: 20 %
MCH RBC QN AUTO: 21.9 PG (ref 26–34)
MCHC RBC AUTO-ENTMCNC: 28.7 G/DL (ref 31–37)
MCV RBC AUTO: 76.1 FL
MONOCYTES # BLD AUTO: 0.65 X10(3) UL (ref 0.1–1)
MONOCYTES NFR BLD AUTO: 7.9 %
NEUTROPHILS # BLD AUTO: 5.79 X10 (3) UL (ref 1.5–7.7)
NEUTROPHILS # BLD AUTO: 5.79 X10(3) UL (ref 1.5–7.7)
NEUTROPHILS NFR BLD AUTO: 70 %
NITRITE UR QL STRIP.AUTO: NEGATIVE
NONHDLC SERPL-MCNC: 153 MG/DL (ref ?–130)
OSMOLALITY SERPL CALC.SUM OF ELEC: 289 MOSM/KG (ref 275–295)
PH UR STRIP.AUTO: 6 [PH] (ref 5–8)
PLATELET # BLD AUTO: 466 10(3)UL (ref 150–450)
POTASSIUM SERPL-SCNC: 4.1 MMOL/L (ref 3.5–5.1)
PROT SERPL-MCNC: 7.9 G/DL (ref 6.4–8.2)
RBC # BLD AUTO: 4.94 X10(6)UL
RBC UR QL AUTO: NEGATIVE
SODIUM SERPL-SCNC: 140 MMOL/L (ref 136–145)
SP GR UR STRIP.AUTO: 1.03 (ref 1–1.03)
TIBC SERPL-MCNC: 614 UG/DL (ref 240–450)
TRANSFERRIN SERPL-MCNC: 412 MG/DL (ref 200–360)
TRIGL SERPL-MCNC: 113 MG/DL (ref 30–149)
TSI SER-ACNC: 1.15 MIU/ML (ref 0.36–3.74)
UROBILINOGEN UR STRIP.AUTO-MCNC: NORMAL MG/DL
VLDLC SERPL CALC-MCNC: 20 MG/DL (ref 0–30)
WBC # BLD AUTO: 8.3 X10(3) UL (ref 4–11)

## 2024-01-23 PROCEDURE — 80053 COMPREHEN METABOLIC PANEL: CPT

## 2024-01-23 PROCEDURE — 87086 URINE CULTURE/COLONY COUNT: CPT

## 2024-01-23 PROCEDURE — 82728 ASSAY OF FERRITIN: CPT

## 2024-01-23 PROCEDURE — 85025 COMPLETE CBC W/AUTO DIFF WBC: CPT

## 2024-01-23 PROCEDURE — 84443 ASSAY THYROID STIM HORMONE: CPT

## 2024-01-23 PROCEDURE — 83550 IRON BINDING TEST: CPT

## 2024-01-23 PROCEDURE — 81001 URINALYSIS AUTO W/SCOPE: CPT

## 2024-01-23 PROCEDURE — 83540 ASSAY OF IRON: CPT

## 2024-01-23 PROCEDURE — 80061 LIPID PANEL: CPT

## 2024-01-23 RX ORDER — OMALIZUMAB 150 MG/ML
300 INJECTION, SOLUTION SUBCUTANEOUS
Qty: 2 ML | Refills: 5 | Status: SHIPPED | OUTPATIENT
Start: 2024-01-23 | End: 2024-01-25

## 2024-01-23 NOTE — TELEPHONE ENCOUNTER
[facility] updated benefit investigation.     [facility] is not patient's covered pharmacy, where Xolair prescription was sent.     Covered Specialty Pharmacy is  [facility].       Xolair Rx sent to [facility] as per medication refill protocol.      Omalizumab (XOLAIR) 150 MG/ML Subcutaneous Solution Prefilled Syringe 2 mL 5 1/23/2024 --    Sig - Route: Inject 300 mg into the skin every 28 days. - Subcutaneous    Sent to pharmacy as: Xolair 150 MG/ML Subcutaneous Solution Prefilled Syringe (Omalizumab)    E-Prescribing Status: Receipt confirmed by pharmacy (1/23/2024  8:33 AM CST)    No prior authorization was found for this prescription.    Found prior authorization for another prescription for the same medication: Approved      Associated Diagnoses    Chronic idiopathic urticaria        Pharmacy    [facility] - [place], [place] - [address] [place] [address] [phone], [phone]     Additional Information

## 2024-01-23 NOTE — TELEPHONE ENCOUNTER
You're using the new request view. View request in the original view.    April Modesto (Young: [id number])  PA Case ID #: [id number]  Need Help? Call us at [phone]  Outcome  Approved today  CaseId:[id number];Status:Approved;Review Type:Prior Auth;Coverage Start Date:01/01/2024;Coverage End Date:05/30/2024;  Authorization Expiration Date: 5/29/2024  Drug  Xolair 150MG/ML syringes  ePA cloud logo  Form  [facility] Electronic PA Form (2017 NCPDP)

## 2024-01-23 NOTE — TELEPHONE ENCOUNTER
Medication Being Authorized     Omalizumab (XOLAIR) 150 MG/ML Subcutaneous Solution Prefilled Syringe    Inject 300 mg into the skin every 28 days.    Dispense: 2 mL Refills: 5     Start: 1/23/2024      Class: Normal Diagnoses: Chronic idiopathic urticaria     This order has been released to its destination.   To be filled at: [address] [phone], [phone]        Prior Authorization History for Omalizumab (XOLAIR) 150 MG/ML Subcutaneous Solution Prefilled Syringe  Payer Waiting for Response    Deadline to reply: February 7, 2024 5:49 AM (In 2 weeks) Case ID: [id number]      Payer: [employer]    [phone]   Prescriber details have been updated to match the prescriber directory.   Traditional Prior Auth     Please provide all information requested. Failure to complete this form in its entirety may result in delayed processing or the PA request will be closed for lack of information   View History

## 2024-01-23 NOTE — TELEPHONE ENCOUNTER
Updated Benefits Investigation report received via fax from [employer].     Covered Specialty Pharmacy is [facility]    [facility]: Rx benefits.       For PA call [phone]    [facility] contacted at [phone].      RN spoke with PA Charo jackson.     Requested PA form for Xolair via fax.     Await form.

## 2024-01-25 DIAGNOSIS — L50.1 CHRONIC IDIOPATHIC URTICARIA: ICD-10-CM

## 2024-01-25 RX ORDER — OMALIZUMAB 150 MG/ML
300 INJECTION, SOLUTION SUBCUTANEOUS
Qty: 6 ML | Refills: 0 | Status: CANCELLED | OUTPATIENT
Start: 2024-01-25

## 2024-01-25 RX ORDER — OMALIZUMAB 150 MG/ML
300 INJECTION, SOLUTION SUBCUTANEOUS
Qty: 6 ML | Refills: 0 | Status: SHIPPED | OUTPATIENT
Start: 2024-01-25

## 2024-01-25 NOTE — TELEPHONE ENCOUNTER
See [employer] message sent to patient.      Triston April,     I have received a benefit investigation update.      Xolair is covered only through your prescription benefit, [facility].      [facility] requires that Xolair be sent to [facility]. 90 day supplies of the medication is covered.      If [facility] or [facility] does not have Xolair in-stock, the pharmacy will request an exemption that Xolair be filled through [facility].      You may still need to bring in the Xolair Copayment Assistance Card when you  the Xolair from the pharmacy. If you have not been in contact with [employer] for new copayment assistance information, please call [employer] at [phone].      I, cannot, request delivery of the Xolair from the hospital pharmacies.  You will be required to  the medication from the pharmacy and bring to your appointment.      Please respond to this message with your preferred hospital pharmacy.  I will then send the prescription for Xolair to you chosen pharmacy.         Regards,        Shannon GHOTRA RN

## 2024-01-25 NOTE — TELEPHONE ENCOUNTER
[facility] contacted at [phone].    RN spoke with pharmacy service repKeysha.       Inquired of rep what are covered specialty pharmacies for Xolair .     Rep states that Xolair must go to either [facility] pharmacy or [facility].     If in-house pharmacy cannot fill Xolair, pharmacy will request an exemption for Xolair to be filled through [facility].

## 2024-01-25 NOTE — TELEPHONE ENCOUNTER
[facility] contacted at [phone].     RN spoke with pharmacy service repVidya.     Attempted to report new PA for Xolair.     Informed by rep that [facility] is out of Network and that covered specialty pharmacy is [facility].       [facility] contacted at [phone].     RN spoke with pharmacy service repMarleen.       Rep confirms that Xolair Rx as below was received from [facility] and does not need any further clarification.     Omalizumab (XOLAIR) 150 MG/ML Subcutaneous Solution Prefilled Syringe 2 mL 5 1/23/2024 --   Sig:   Inject 300 mg into the skin every 28 days.     Route:   Subcutaneous       Reported PA approval as below.       April Modesto (Key: [id number])  PA Case ID #: [id number]  Need Help? Call us at [phone]  Outcome  Approved today  CaseId:[id number];Status:Approved;Review Type:Prior Auth;Coverage Start Date:01/01/2024;Coverage End Date:05/30/2024;  Authorization Expiration Date: 5/29/2024  Drug  Xolair 150MG/ML syringes  ePA cloud logo  Form  [facility] Electronic PA Form (2017 NCPDP)       Rep states that PA will not go through and covered specialty pharmacy is [facility].

## 2024-01-25 NOTE — TELEPHONE ENCOUNTER
See Freedom2 message sent to patient.        You  April Kellen Delgadillo now (12:38 PM)     HP  Hi April,      As an addendum to the previous Freedom2 Message, [facility] will not be open until sometime in March of 2024.      I will send the prescription for Xolair to [facility].         Regards,     Shannon GHOTRA RN           Requested Prescriptions   Pending Prescriptions Disp Refills    Omalizumab (XOLAIR) 150 MG/ML Subcutaneous Solution Prefilled Syringe 6 mL 0     Sig: Inject 300 mg into the skin every 28 days.       Biologic Medications Passed - 1/25/2024 12:39 PM        Passed - Appt in past 6 mos or next 3 mos     Recent Outpatient Visits              1 week ago Chronic idiopathic urticaria    [facility] [place]    Nurse Only    1 month ago Chronic idiopathic urticaria    [facility] [place]    Nurse Only    2 months ago Chronic idiopathic urticaria    [facility] [place] Daniel Paul MD    Office Visit    4 months ago Encounter for annual routine gynecological examination    [address] - OB/GYN Leisa Machuca MD    Office Visit    4 months ago Visit for occupational health examination    [facility]ational [facility]    Office Visit          Future Appointments         Provider Department Appt Notes    Tomorrow Aylin Gasca MD [address] PHYSICAL    In 2 weeks Daniel Paul MD [facility] [place] CIU f/u just prior to 3rd Xolair injection    In 2 weeks EC ALLERGY [facility] [place] 3rd Xolair Injection                       Omalizumab (XOLAIR) 150 MG/ML Subcutaneous Solution Prefilled Syringe 6 mL 0 1/25/2024 --    Sig - Route: Inject 300 mg into the skin every 28 days. -  Subcutaneous    Sent to pharmacy as: Xolair 150 MG/ML Subcutaneous Solution Prefilled Syringe (Omalizumab)    E-Prescribing Status: Receipt confirmed by pharmacy (1/25/2024 12:51 PM CST)    No prior authorization was found for this prescription.    Found prior authorization for another prescription for the same medication: Closed - Prior Authorization duplicate/in process      Associated Diagnoses    Chronic idiopathic urticaria        [address], [facility] [address] [phone], [phone]     Prescription as above sent to pharmacy per protocol.

## 2024-01-26 ENCOUNTER — OFFICE VISIT (OUTPATIENT)
Dept: FAMILY MEDICINE CLINIC | Facility: CLINIC | Age: 34
End: 2024-01-26
Payer: COMMERCIAL

## 2024-01-26 VITALS
TEMPERATURE: 98 F | RESPIRATION RATE: 16 BRPM | HEIGHT: 61 IN | WEIGHT: 128 LBS | HEART RATE: 100 BPM | DIASTOLIC BLOOD PRESSURE: 70 MMHG | BODY MASS INDEX: 24.17 KG/M2 | SYSTOLIC BLOOD PRESSURE: 116 MMHG

## 2024-01-26 DIAGNOSIS — Z00.00 PHYSICAL EXAM, ANNUAL: Primary | ICD-10-CM

## 2024-01-26 DIAGNOSIS — D50.9 IRON DEFICIENCY ANEMIA, UNSPECIFIED IRON DEFICIENCY ANEMIA TYPE: ICD-10-CM

## 2024-01-26 DIAGNOSIS — R31.21 ASYMPTOMATIC MICROSCOPIC HEMATURIA: ICD-10-CM

## 2024-01-26 PROCEDURE — 99395 PREV VISIT EST AGE 18-39: CPT | Performed by: FAMILY MEDICINE

## 2024-01-26 PROCEDURE — 3078F DIAST BP <80 MM HG: CPT | Performed by: FAMILY MEDICINE

## 2024-01-26 PROCEDURE — 3074F SYST BP LT 130 MM HG: CPT | Performed by: FAMILY MEDICINE

## 2024-01-26 PROCEDURE — 3008F BODY MASS INDEX DOCD: CPT | Performed by: FAMILY MEDICINE

## 2024-01-26 NOTE — PROGRESS NOTES
HPI:   Stacy Salvador is a 33 year old female who presents for a complete physical exam. Symptoms: denies discharge, itching, burning or dysuria.   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  Patient had anemia iron deficiency.  She said that her diet is not good.  Menses are light regular.  Denies any blood in the stool.  No abdominal pain.  Having some frequency in urination.  Will do ultrasound of the kidneys and bladder for evaluation since  patient had blood on the urine test.  Patient sees OB/GYN for breast and pelvic examination.    Immunization History   Administered Date(s) Administered    >=3 YRS TRI  MULTIDOSE VIAL (73708) FLU CLINIC 11/13/2023    Covid-19 Vaccine Pfizer Hans-Sucrose 30 mcg/0.3 ml 02/19/2021, 03/11/2021, 10/18/2021    Influenza 05/19/2017, 01/12/2018, 11/13/2018, 05/08/2019, 12/13/2019, 05/22/2020, 11/20/2020, 07/30/2021    MMR 05/19/2017, 07/05/2017    TDAP 05/19/2017      Wt Readings from Last 6 Encounters:   01/26/24 128 lb (58.1 kg)   11/18/23 125 lb (56.7 kg)   09/27/23 127 lb (57.6 kg)   12/12/22 125 lb 6.4 oz (56.9 kg)     Body mass index is 24.19 kg/m².     Cholesterol, Total (mg/dL)   Date Value   01/23/2024 205 (H)   12/20/2022 206 (H)     HDL Cholesterol (mg/dL)   Date Value   01/23/2024 52   12/20/2022 52     LDL Cholesterol (mg/dL)   Date Value   01/23/2024 133 (H)   12/20/2022 139 (H)     AST (U/L)   Date Value   01/23/2024 10 (L)   12/20/2022 10 (L)     ALT   Date Value   01/23/2024      Comment:     Due to  backorder we are temporarily unable to offer hospital-based ALT testing at [facility].   If urgently needed, please order ALT test code [id number].   The new order will need a new venipuncture and will be sent to [facility] for testing.   The expected turnaround time will be within 24 hours.    12/20/2022 16 U/L      No results found for: \"GLUCOSE\"     Current Outpatient Medications   Medication Sig Dispense Refill    Omalizumab (XOLAIR) 150  MG/ML Subcutaneous Solution Prefilled Syringe Inject 300 mg into the skin every 28 days. 6 mL 0    EPINEPHrine 0.3 MG/0.3ML Injection Solution Auto-injector       tretinoin 0.025 % External Cream       triamcinolone 0.1 % External Cream APPLY TO THE ABDOMEN, FLANK, AND BACK TWICE DAILY AS NEEDED        History reviewed. No pertinent past medical history.   History reviewed. No pertinent surgical history.   Family History   Problem Relation Age of Onset    Obesity Father     Other (gout) Father     Other (Other) Father     Hypertension Father     Prostate Cancer Father     Other (chronic kidney disease) Mother     Other (Other) Mother     Hypertension Mother     Heart Disorder Maternal Grandmother     Diabetes Maternal Grandmother     Heart Disorder Maternal Grandfather     Other (Alzheimers) Paternal Grandmother     Other (gout) Paternal Grandfather     Other (gout) Brother       Social History:   Social History     Socioeconomic History    Marital status:    Tobacco Use    Smoking status: Never     Passive exposure: Never    Smokeless tobacco: Never   Vaping Use    Vaping Use: Never used   Substance and Sexual Activity    Alcohol use: Yes    Drug use: Never    Sexual activity: Yes     Partners: Female     Occ: Orthopedic office : yes. Children: none.   Exercise: three times per week.  Diet: watches calories closely     REVIEW OF SYSTEMS:   GENERAL: feels well otherwise  SKIN: denies any unusual skin lesions  EYES:denies blurred vision or double vision  HEENT: denies nasal congestion, sinus pain or ST  LUNGS: denies shortness of breath with exertion  CARDIOVASCULAR: denies chest pain on exertion  GI: denies abdominal pain,denies heartburn  : denies dysuria, vaginal discharge or itching,periods regular , light  MUSCULOSKELETAL: denies back pain, lump in right arm pain  NEURO: denies headaches lump in the  PSYCHE: denies depression or anxiety  HEMATOLOGIC: denies hx of anemia  ENDOCRINE: denies thyroid  history  ALL/ASTHMA: denies hx of allergy or asthma    EXAM:   /70 (BP Location: Left arm, Patient Position: Sitting, Cuff Size: adult)   Pulse 100   Temp 98.1 °F (36.7 °C) (Temporal)   Resp 16   Ht 5' 1\" (1.549 m)   Wt 128 lb (58.1 kg)   LMP 12/12/2023 (Exact Date)   BMI 24.19 kg/m²   Body mass index is 24.19 kg/m².   GENERAL: well developed, well nourished,in no apparent distress  SKIN: no rashes,no suspicious lesions  HEENT: atraumatic, normocephalic,ears and throat are clear  EYES:PERRLA, EOMI,conjunctiva are clear  NECK: supple,no adenopathy  CHEST: no chest tenderness  BREAST: Deferred  LUNGS: clear to auscultation  CARDIO: RRR without murmur  GI: good BS's,no masses, HSM or tenderness  : Pap smear was completed this year  RECTAL: Deferred  MUSCULOSKELETAL: back is not tender,FROM of the back  EXTREMITIES: no cyanosis, clubbing or edema  NEURO: Oriented times three,cranial nerves are intact,motor and sensory are grossly intact    Results for orders placed or performed in visit on 01/23/24   Comp Metabolic Panel (14)   Result Value Ref Range    Glucose 87 70 - 99 mg/dL    Sodium 140 136 - 145 mmol/L    Potassium 4.1 3.5 - 5.1 mmol/L    Chloride 108 98 - 112 mmol/L    CO2 26.0 21.0 - 32.0 mmol/L    Anion Gap 6 0 - 18 mmol/L    BUN 12 9 - 23 mg/dL    Creatinine 0.72 0.55 - 1.02 mg/dL    Calcium, Total 9.2 8.5 - 10.1 mg/dL    Calculated Osmolality 289 275 - 295 mOsm/kg    eGFR-Cr 112 >=60 mL/min/1.73m2    AST 10 (L) 15 - 37 U/L    ALT      Alkaline Phosphatase 64 37 - 98 U/L    Bilirubin, Total 0.3 0.1 - 2.0 mg/dL    Total Protein 7.9 6.4 - 8.2 g/dL    Albumin 3.8 3.4 - 5.0 g/dL    Globulin  4.1 2.8 - 4.4 g/dL    A/G Ratio 0.9 (L) 1.0 - 2.0    Patient Fasting for CMP? Yes    Lipid Panel   Result Value Ref Range    Cholesterol, Total 205 (H) <200 mg/dL    HDL Cholesterol 52 40 - 59 mg/dL    Triglycerides 113 30 - 149 mg/dL    LDL Cholesterol 133 (H) <100 mg/dL    VLDL 20 0 - 30 mg/dL    Non HDL Chol  153 (H) <130 mg/dL    Patient Fasting for Lipid? Yes    TSH W Reflex To Free T4   Result Value Ref Range    TSH 1.150 0.358 - 3.740 mIU/mL   UA/M with Culture Reflex    Specimen: Urine, clean catch   Result Value Ref Range    Urine Color Yellow Yellow    Clarity Urine Clear Clear    Spec Gravity 1.028 1.005 - 1.030    Glucose Urine Normal Normal mg/dL    Bilirubin Urine Negative Negative    Ketones Urine Negative Negative mg/dL    Blood Urine Negative Negative    pH Urine 6.0 5.0 - 8.0    Protein Urine Trace (A) Negative mg/dL    Urobilinogen Urine Normal Normal mg/dL    Nitrite Urine Negative Negative    Leukocyte Esterase Urine 75 (A) Negative    WBC Urine 1-5 0 - 5 /HPF    RBC Urine 3-5 (A) 0 - 2 /HPF    Bacteria Urine None Seen None Seen /HPF    Squamous Epi. Cells Few (A) None Seen /HPF    Renal Tubular Epithelial Cells None Seen None Seen /HPF    Transitional Cells None Seen None Seen /HPF    Yeast Urine None Seen None Seen /HPF   Iron And Tibc [E]   Result Value Ref Range    Iron 24 (L) 50 - 170 ug/dL    Transferrin 412 (H) 200 - 360 mg/dL    Total Iron Binding Capacity 614 (H) 240 - 450 ug/dL    % Saturation 4 (L) 15 - 50 %   Ferritin [E]   Result Value Ref Range    Ferritin 4.8 (L) 12.0 - 160.0 ng/mL   Urine Culture, Routine    Specimen: Urine, clean catch   Result Value Ref Range    Urine Culture No Growth at 18-24 hrs.    CBC W/ DIFFERENTIAL   Result Value Ref Range    WBC 8.3 4.0 - 11.0 x10(3) uL    RBC 4.94 3.80 - 5.30 x10(6)uL    HGB 10.8 (L) 12.0 - 16.0 g/dL    HCT 37.6 35.0 - 48.0 %    .0 (H) 150.0 - 450.0 10(3)uL    MCV 76.1 (L) 80.0 - 100.0 fL    MCH 21.9 (L) 26.0 - 34.0 pg    MCHC 28.7 (L) 31.0 - 37.0 g/dL    RDW 14.9 %    Neutrophil Absolute Prelim 5.79 1.50 - 7.70 x10 (3) uL    Neutrophil Absolute 5.79 1.50 - 7.70 x10(3) uL    Lymphocyte Absolute 1.65 1.00 - 4.00 x10(3) uL    Monocyte Absolute 0.65 0.10 - 1.00 x10(3) uL    Eosinophil Absolute 0.09 0.00 - 0.70 x10(3) uL    Basophil  Absolute 0.05 0.00 - 0.20 x10(3) uL    Immature Granulocyte Absolute 0.03 0.00 - 1.00 x10(3) uL    Neutrophil % 70.0 %    Lymphocyte % 20.0 %    Monocyte % 7.9 %    Eosinophil % 1.1 %    Basophil % 0.6 %    Immature Granulocyte % 0.4 %      ASSESSMENT AND PLAN:   Stacy Salvador is a 33 year old female who presents for a complete physical exam.   Encounter Diagnoses   Name Primary?    Physical exam, annual Yes    Iron deficiency anemia, unspecified iron deficiency anemia type     Asymptomatic microscopic hematuria        Orders Placed This Encounter   Procedures    Ferritin    Iron And Tibc    CBC With Differential With Platelet    Occult Blood, Fecal, Immunoassay (Blue cards) [E]    UA/M With Culture Reflex [E]       Meds & Refills for this Visit:  Requested Prescriptions      No prescriptions requested or ordered in this encounter   Call [phone] to schedule US kidneys.   Start ferrous sulfate/iron 325 mg 1 tablet daily with vitamin C 500 mg daily over-the-counter.  Recheck blood work for anemia and urine test in 3 months.  Do test focal blood from the stool now.  Healthy diet.  Stay active.   Follow-up in 3 months after blood work in office.    Imaging & Consults:  US KIDNEY/BLADDER (CPT=76770)   Pap and pelvic done  by gyn.  R  Self breast exam explained. Health maintenance, will check fasting Lipids, CMP, and CBC. Pt will be at 43 referred for screening colonoscopy. Pt' s weight is Body mass index is 24.19 kg/m²., recommended low carb diet and aerobic exercise 30 minutes three times weekly.  The patient indicates understanding of these issues and agrees to the plan.  The patient is asked to return for CPX in 1 year.

## 2024-01-26 NOTE — PATIENT INSTRUCTIONS
Call [phone] to schedule US kidneys.   Start ferrous sulfate/iron 325 mg 1 tablet daily with vitamin C 500 mg daily over-the-counter.  Recheck blood work for anemia and urine test in 3 months.  Do test focal blood from the stool now.  Healthy diet.  Stay active.   Follow-up in 3 months after blood work in office.

## 2024-01-29 NOTE — TELEPHONE ENCOUNTER
Call transferred from phone room.      Erin REYES, pharmacy , from [facility] on the line.     She offers that [facility] was sent a Xolair prescription from [facility].  Xolair Rx will need to be given verbally to a pharmacist.     Tech informed that patient must receive Xolair through [facility], no need to transfer prescription for Xolair.  Tech asked to cancel prescription.

## 2024-02-12 ENCOUNTER — TELEMEDICINE (OUTPATIENT)
Dept: ALLERGY | Facility: CLINIC | Age: 34
End: 2024-02-12
Payer: COMMERCIAL

## 2024-02-12 DIAGNOSIS — Z23 FLU VACCINE NEED: ICD-10-CM

## 2024-02-12 DIAGNOSIS — L50.1 CHRONIC IDIOPATHIC URTICARIA: Primary | ICD-10-CM

## 2024-02-12 DIAGNOSIS — Z92.29 COVID-19 VACCINE SERIES COMPLETED: ICD-10-CM

## 2024-02-12 NOTE — PROGRESS NOTES
Stacy Salvador is a 33 year old female.    HPI:   No chief complaint on file.    Patient is a 33-year-old female who presents for follow-up via video visit  Patient last seen by me on November 18, 2023  Patient has a history of chronic idiopathic urticaria over the past 10+ years.  Patient was started on Xolair in November/dec  2023 for treatment of CIU.  Xolair third dose is due in February 2024    Medication list include Xolair EpiPen triamcinolone    Review of immunization records notes COVID-vaccine x 3 doses last in 2021  No flu vaccine on record for this fall/winter    Today patient reports    Chronic idiopathic urticaria  Active or persistent symptoms: yes  No improvement in interim   Freq: daily   ED visits or prednisone in the interim:  denies  Active meds: Xolair, antihistamines  Xyzal 4x/day   Xolair dose #3 tomorrow  No prior pepcid  Singulair did not help   Xolair in my office   No angioedema  Back and chest     HISTORY:  No past medical history on file.   No past surgical history on file.   Family History   Problem Relation Age of Onset    Obesity Father     Other (gout) Father     Other (Other) Father     Hypertension Father     Prostate Cancer Father     Other (chronic kidney disease) Mother     Other (Other) Mother     Hypertension Mother     Heart Disorder Maternal Grandmother     Diabetes Maternal Grandmother     Heart Disorder Maternal Grandfather     Other (Alzheimers) Paternal Grandmother     Other (gout) Paternal Grandfather     Other (gout) Brother       Social History:   Social History     Socioeconomic History    Marital status:    Tobacco Use    Smoking status: Never     Passive exposure: Never    Smokeless tobacco: Never   Vaping Use    Vaping Use: Never used   Substance and Sexual Activity    Alcohol use: Yes    Drug use: Never    Sexual activity: Yes     Partners: Female        Medications (Active prior to today's visit):  Current Outpatient Medications   Medication Sig Dispense  Refill    Omalizumab (XOLAIR) 150 MG/ML Subcutaneous Solution Prefilled Syringe Inject 300 mg into the skin every 28 days. 6 mL 0    EPINEPHrine 0.3 MG/0.3ML Injection Solution Auto-injector       tretinoin 0.025 % External Cream       triamcinolone 0.1 % External Cream APPLY TO THE ABDOMEN, FLANK, AND BACK TWICE DAILY AS NEEDED         Allergies:  No Known Allergies      ROS:   Allergic/Immuno:  See hpi  Cardiovascular:  Negative for irregular heartbeat/palpitations, chest pain, edema  Constitutional:  Negative night sweats,weight loss, irritability and lethargy  ENMT:  Negative for ear drainage, hearing loss and nasal drainage  Eyes:  Negative for eye discharge and vision loss  Gastrointestinal:  Negative for abdominal pain, diarrhea and vomiting  Integumentary see hpi   Respiratory:  Negative for cough, dyspnea and wheezing    PHYSICAL EXAM:   Constitutional: responsive, no acute distress noted  Head/Face: NC/Atraumatic  Speaking in full sentences no increased work of breathing  A&O x 3       ASSESSMENT/PLAN:   Assessment   Encounter Diagnoses   Name Primary?    Chronic idiopathic urticaria Yes    Flu vaccine need     COVID-19 vaccine series completed        #1 chronic idiopathic urticaria  Still with active hives per pt report almost daily  2 doses of Xolair so far   Next dose 300 mg is tomorrow.  Patient reports no significant improvement since starting Xolair.  Reviewed efficacyof xolair is typically accessed after 3 to 6 months of Xolair dosing.  Currently using Xyzal once a day up to 4 times per day if needed.  No angioedema.  No ED visits or EpiPen usage in the interim  Reviewed with patient we typically recommend a 6-month trial of Xolair prior to consider discontinuation  Continue with Xolair 300 mg every 4 weeks    #2 flu vaccine recommended and offered      #3 COVID vaccines reviewed.  Recommend booster.  New booster available since September 2023.  Reviewed I do not have the COVID-vaccine in my  office         Orders This Visit:  No orders of the defined types were placed in this encounter.      Meds This Visit:  Requested Prescriptions      No prescriptions requested or ordered in this encounter       Imaging & Referrals:  None     2/12/2024  Daniel Paul MD    If medication samples were provided today, they were provided solely for patient education and training related to self administration of these medications.  Teaching, instruction and sample was provided to the patient by myself.  Teaching included  a review of potential adverse side effects as well as potential efficacy.  Patient's questions were answered in regards to medication administration and dosing and potential side effects. Teaching was provided via the teach back method

## 2024-02-13 ENCOUNTER — TELEPHONE (OUTPATIENT)
Dept: ALLERGY | Facility: CLINIC | Age: 34
End: 2024-02-13

## 2024-02-13 ENCOUNTER — NURSE ONLY (OUTPATIENT)
Dept: ALLERGY | Facility: CLINIC | Age: 34
End: 2024-02-13

## 2024-02-13 VITALS — SYSTOLIC BLOOD PRESSURE: 118 MMHG | DIASTOLIC BLOOD PRESSURE: 78 MMHG | OXYGEN SATURATION: 99 % | HEART RATE: 92 BPM

## 2024-02-13 DIAGNOSIS — L50.1 CHRONIC IDIOPATHIC URTICARIA: Primary | ICD-10-CM

## 2024-02-13 PROCEDURE — 96372 THER/PROPH/DIAG INJ SC/IM: CPT | Performed by: ALLERGY & IMMUNOLOGY

## 2024-02-13 NOTE — TELEPHONE ENCOUNTER
Patient presenting for 3rd Xolair injection today.     May patient be given Xolair injection training for at home use?

## 2024-02-13 NOTE — PROGRESS NOTES
Patient confirms that he/she does have an Epi-Pen to use in case of an anaphylactic reaction due to Xolair.       Printed Official from [facility], Xolair Administration Instructions reviewed with patient.       Patient watched form [facility] site, Xolair Administration Video.       Patient  demonstrated how to give Xolair injection with Demo .      Patient demonstrated Subcutaneous Xolair Injection well.      Patient repeated back instructions for Xolair administration.      - Keep Xolair refrigerated, remove from refrigerator 60 min prior to administration. Leave at room temp until ready to inject.     -Inspect Xolair syringe (right med, right dose), contents of Xolair clear with no particles. Make sure Xolair syringe not .      - Wash hands, cleanse injection site with alcohol wipe.     -Remove cap, inject Xolair to upper thighs, abdomen 2 inches away from umbilicus at 45 to 90 degree angle with bevel up.      - Rotate sites monthly     -Do not rub injection site, place band-aid on if needed.     -Place syringe in sharp container.     Patient agreed to administer Epi-Pen if hives, shortness of breath, tightness in chest, edema of face/airway occur and to present to Emergency Department immediately after administration of Epi-Pen.     Patient agreed to report any adverse reaction or allergic reaction symptoms to Allergy RNs or Dr. Paul if they should occur.     Patient verbalized comfort in administrating Epi-Pen if needed.     RN injected 1 150 mg/mL syringe in patient's abdomen on the right side    Patient injected 1 150 mg/mL syringe into her abdomen on the left side after watching RN administer     Patient was observed for over 30 minutes after administration with no adverse effects      Patient verbalizes that they feel comfortable injecting Xolair at home.

## 2024-05-22 ENCOUNTER — TELEPHONE (OUTPATIENT)
Dept: ALLERGY | Facility: CLINIC | Age: 34
End: 2024-05-22

## 2024-05-22 NOTE — TELEPHONE ENCOUNTER
Prior authorization for Xolair currently expires on May 30, 2024.  Notified via Covermymeds that prior authorization is due for completion.

## 2024-05-28 NOTE — TELEPHONE ENCOUNTER
MANUEL LENNON (Young: [id number])    This request has been approved.    CaseId:[id number]  Status:Approved  Review Type:Prior Auth;  Coverage Start Date:04/22/2024  Coverage End Date:05/22/2025

## 2024-05-31 ENCOUNTER — PATIENT MESSAGE (OUTPATIENT)
Dept: FAMILY MEDICINE CLINIC | Facility: CLINIC | Age: 34
End: 2024-05-31

## 2024-06-03 ENCOUNTER — TELEMEDICINE (OUTPATIENT)
Dept: ALLERGY | Facility: CLINIC | Age: 34
End: 2024-06-03
Payer: COMMERCIAL

## 2024-06-03 DIAGNOSIS — L50.1 CHRONIC IDIOPATHIC URTICARIA: Primary | ICD-10-CM

## 2024-06-03 DIAGNOSIS — Z92.29 COVID-19 VACCINE SERIES COMPLETED: ICD-10-CM

## 2024-06-03 DIAGNOSIS — Z23 FLU VACCINE NEED: ICD-10-CM

## 2024-06-03 NOTE — TELEPHONE ENCOUNTER
From: April Kellen Salvador  To: Aylin Gasca  Sent: 5/31/2024 10:25 AM CDT  Subject: Cancel Appt Today    Hi! I would like to request to cancel my appt today because I am still here in the [facility] and [place] is today. I have spoken to More[place] regarding this.     Also, I am only able to reschedule my US Kidney on Maite 15, can I reschedule my appt w/ Dr Viera after Maite 15 pls?     Thank you so much and I apologize for any inconvinence caused.     April

## 2024-06-03 NOTE — PROGRESS NOTES
Stacy Salvador is a 34 year old female.    HPI:   No chief complaint on file.    Patient is a 34 old female who presents for follow-up via video visit      This visit is conducted using Telemedicine with live, interactive video and audio during this Coronavirus pandemic.     Please note that the following visit was completed using two-way, real-time interactive audio and/or video communication.  This has been done in good cookie to provide continuity of care in the best interest of the provider-patient relationship, due to the ongoing public health crisis/national emergency and because of restrictions of visitation.  There are limitations of this visit as no physical exam could be performed.  Every conscious effort was taken to allow for sufficient and adequate time.  This billing was spent on reviewing labs, medications, radiology tests and decision making.  Appropriate medical decision-making and tests are ordered as detailed in the plan of care below patient last seen by me in    February 2024  Patient with a history of chronic idiopathic urticaria for 10+ years.  Xolair started in December 2023 for chronic idiopathic urticaria  Medications also include EpiPen and Xyzal  Xolair dosing is 300 mg every 4 weeks    Today patient reports    Ciu   Active or persistent symptoms:   ED visits or prednisone in the interim  Active meds:   Was out of town 4/25/24 and back this week  Miised last injection with xolair  Flared in [facility] ,   Saw derm in [facility] and tx with steroids x10 day   last xolair was end march     Vaccines reviewed.  COVID-vaccine x 3 doses last in October 2021  Flu vaccine up-to-date from Nov 023      HISTORY:  No past medical history on file.   No past surgical history on file.   Family History   Problem Relation Age of Onset    Obesity Father     Other (gout) Father     Other (Other) Father     Hypertension Father     Prostate Cancer Father     Other (chronic kidney disease) Mother     Other  (Other) Mother     Hypertension Mother     Heart Disorder Maternal Grandmother     Diabetes Maternal Grandmother     Heart Disorder Maternal Grandfather     Other (Alzheimers) Paternal Grandmother     Other (gout) Paternal Grandfather     Other (gout) Brother       Social History:   Social History     Socioeconomic History    Marital status:    Tobacco Use    Smoking status: Never     Passive exposure: Never    Smokeless tobacco: Never   Vaping Use    Vaping status: Never Used   Substance and Sexual Activity    Alcohol use: Yes    Drug use: Never    Sexual activity: Yes     Partners: Female        Medications (Active prior to today's visit):  Current Outpatient Medications   Medication Sig Dispense Refill    Omalizumab (XOLAIR) 150 MG/ML Subcutaneous Solution Prefilled Syringe Inject 300 mg into the skin every 28 days. 6 mL 0    EPINEPHrine 0.3 MG/0.3ML Injection Solution Auto-injector  (Patient not taking: Reported on 2/13/2024)      tretinoin 0.025 % External Cream  (Patient not taking: Reported on 2/13/2024)      triamcinolone 0.1 % External Cream APPLY TO THE ABDOMEN, FLANK, AND BACK TWICE DAILY AS NEEDED         Allergies:  No Known Allergies      ROS:   Allergic/Immuno:  See hpi  Cardiovascular:  Negative for irregular heartbeat/palpitations, chest pain, edema  Constitutional:  Negative night sweats,weight loss, irritability and lethargy  ENMT:  Negative for ear drainage, hearing loss and nasal drainage  Eyes:  Negative for eye discharge and vision loss  Gastrointestinal:  Negative for abdominal pain, diarrhea and vomiting  Integumentary:  Negative for pruritus and rash  Respiratory:  Negative for cough, dyspnea and wheezing    PHYSICAL EXAM:   Constitutional: responsive, no acute distress noted  Head/Face: NC/Atraumatic  Speaking in full sentences no increased work of breathing  A&O x 3     ASSESSMENT/PLAN:   Assessment   Encounter Diagnoses   Name Primary?    Chronic idiopathic urticaria Yes    Flu  vaccine need     COVID-19 vaccine series completed      #1 chronic idiopathic urticaria  + FLARE.  Last Xolair dose was end of March.  Patient went to the [facility] and forgot her Xolair dose.  Therefore last dose was 2 months ago  Restart Xolair 3 00mg every 4 weeks  Continue with current prednisone from the dermatologist that she saw in the [facility]  Xyzal 5 mg up to 4 times per day  Begin Pepcid 20 mg twice a day if refractory.  Recommended for 6-month trial of Xolair for considering a nonresponder.    #2 flu vaccine recommended in the fall      #3 COVID vaccines reviewed.  Recommend booster.  Reviewed I do not have the booster in my office.  Please check with local pharmacy.           Orders This Visit:  No orders of the defined types were placed in this encounter.      Meds This Visit:  Requested Prescriptions      No prescriptions requested or ordered in this encounter       Imaging & Referrals:  None     6/3/2024  Daniel Paul MD    If medication samples were provided today, they were provided solely for patient education and training related to self administration of these medications.  Teaching, instruction and sample was provided to the patient by myself.  Teaching included  a review of potential adverse side effects as well as potential efficacy.  Patient's questions were answered in regards to medication administration and dosing and potential side effects. Teaching was provided via the teach back method

## 2024-07-05 DIAGNOSIS — L50.1 CHRONIC IDIOPATHIC URTICARIA: ICD-10-CM

## 2024-07-08 RX ORDER — OMALIZUMAB 150 MG/ML
300 INJECTION, SOLUTION SUBCUTANEOUS
Qty: 6 ML | Refills: 0 | Status: SHIPPED | OUTPATIENT
Start: 2024-07-08

## 2024-07-08 NOTE — TELEPHONE ENCOUNTER
Patient last seen in Allergy 6/3/2024 for . . .    Chronic idiopathic urticaria Yes     Flu vaccine need      COVID-19 vaccine series completed      Requested Prescriptions   Pending Prescriptions Disp Refills    XOLAIR 150 MG/ML Subcutaneous Solution Prefilled Syringe [Pharmacy Med Name: Xolair 150 mg/mL subcutaneous syringe] 6 mL 0     Sig: Inject 300 mg into the skin every 28 days.       Biologic Medications Passed - 7/5/2024  9:11 AM        Passed - Appt in past 6 mos or next 3 mos     Recent Outpatient Visits              1 month ago Chronic idiopathic urticaria    [facility], Daniel Tinoco MD    Telemedicine    4 months [facility]    4 months [facility]    [facility]    5 months ago Physical exam, annual    [address] Aylin Gasca MD    Office Visit    5 months ago [facility]          Future Appointments         Provider Department Appt Notes    In 5 days [facility] [address] Ultrasound - [place] u/s Kidney    In 1 week Aylin Gasca MD [address] follow up                       XOLAIR 150 MG/ML Subcutaneous Solution Prefilled Syringe 6 mL 0 7/8/2024 --    Sig - Route: Inject 300 mg into the skin every 28 days. - Subcutaneous    Sent to pharmacy as: Xolair 150 MG/ML Subcutaneous Solution Prefilled Syringe (Omalizumab)    E-Prescribing Status: Sent to pharmacy (7/8/2024  9:50 AM CDT)    No prior authorization was found for this prescription.    Found prior authorization for another prescription for the same medication: Closed - Prior Authorization duplicate/in  process      Associated Diagnoses    Chronic idiopathic urticaria        Pharmacy    [facility] - [place], [place] - [address] [place] [address], [address] [phone], [phone]               Prescription as above sent to pharmacy per protocol

## 2024-07-13 ENCOUNTER — HOSPITAL ENCOUNTER (OUTPATIENT)
Dept: ULTRASOUND IMAGING | Age: 34
Discharge: HOME | End: 2024-07-13
Attending: FAMILY MEDICINE
Payer: COMMERCIAL

## 2024-07-13 ENCOUNTER — LAB ENCOUNTER (OUTPATIENT)
Dept: LAB | Age: 34
End: 2024-07-13
Attending: FAMILY MEDICINE
Payer: COMMERCIAL

## 2024-07-13 DIAGNOSIS — R31.21 ASYMPTOMATIC MICROSCOPIC HEMATURIA: ICD-10-CM

## 2024-07-13 DIAGNOSIS — D50.9 IRON DEFICIENCY ANEMIA, UNSPECIFIED IRON DEFICIENCY ANEMIA TYPE: ICD-10-CM

## 2024-07-13 LAB
BASOPHILS # BLD AUTO: 0.04 X10(3) UL (ref 0–0.2)
BASOPHILS NFR BLD AUTO: 0.6 %
BILIRUB UR QL: NEGATIVE
CLARITY UR: CLEAR
COLOR UR: COLORLESS
DEPRECATED HBV CORE AB SER IA-ACNC: 9.3 NG/ML
DEPRECATED RDW RBC AUTO: 44.1 FL (ref 35.1–46.3)
EOSINOPHIL # BLD AUTO: 0.07 X10(3) UL (ref 0–0.7)
EOSINOPHIL NFR BLD AUTO: 1 %
ERYTHROCYTE [DISTWIDTH] IN BLOOD BY AUTOMATED COUNT: 15.3 % (ref 11–15)
GLUCOSE UR-MCNC: NORMAL MG/DL
HCT VFR BLD AUTO: 39.8 %
HGB BLD-MCNC: 12.7 G/DL
HGB UR QL STRIP.AUTO: NEGATIVE
IMM GRANULOCYTES # BLD AUTO: 0.02 X10(3) UL (ref 0–1)
IMM GRANULOCYTES NFR BLD: 0.3 %
IRON SATN MFR SERPL: 34 %
IRON SERPL-MCNC: 189 UG/DL
KETONES UR-MCNC: NEGATIVE MG/DL
LEUKOCYTE ESTERASE UR QL STRIP.AUTO: 25
LYMPHOCYTES # BLD AUTO: 1.62 X10(3) UL (ref 1–4)
LYMPHOCYTES NFR BLD AUTO: 23.6 %
MCH RBC QN AUTO: 25.2 PG (ref 26–34)
MCHC RBC AUTO-ENTMCNC: 31.9 G/DL (ref 31–37)
MCV RBC AUTO: 79.1 FL
MONOCYTES # BLD AUTO: 0.54 X10(3) UL (ref 0.1–1)
MONOCYTES NFR BLD AUTO: 7.9 %
NEUTROPHILS # BLD AUTO: 4.57 X10 (3) UL (ref 1.5–7.7)
NEUTROPHILS # BLD AUTO: 4.57 X10(3) UL (ref 1.5–7.7)
NEUTROPHILS NFR BLD AUTO: 66.6 %
NITRITE UR QL STRIP.AUTO: NEGATIVE
PH UR: 6.5 [PH] (ref 5–8)
PLATELET # BLD AUTO: 337 10(3)UL (ref 150–450)
PROT UR-MCNC: NEGATIVE MG/DL
RBC # BLD AUTO: 5.03 X10(6)UL
SP GR UR STRIP: 1.01 (ref 1–1.03)
TIBC SERPL-MCNC: 551 UG/DL (ref 250–425)
TRANSFERRIN SERPL-MCNC: 370 MG/DL (ref 250–380)
UROBILINOGEN UR STRIP-ACNC: NORMAL
WBC # BLD AUTO: 6.9 X10(3) UL (ref 4–11)

## 2024-07-13 PROCEDURE — 76770 US EXAM ABDO BACK WALL COMP: CPT | Performed by: FAMILY MEDICINE

## 2024-07-13 PROCEDURE — 81001 URINALYSIS AUTO W/SCOPE: CPT

## 2024-07-13 PROCEDURE — 84466 ASSAY OF TRANSFERRIN: CPT

## 2024-07-13 PROCEDURE — 82728 ASSAY OF FERRITIN: CPT

## 2024-07-13 PROCEDURE — 87086 URINE CULTURE/COLONY COUNT: CPT

## 2024-07-13 PROCEDURE — 85025 COMPLETE CBC W/AUTO DIFF WBC: CPT

## 2024-07-13 PROCEDURE — 83540 ASSAY OF IRON: CPT

## 2024-07-13 PROCEDURE — 36415 COLL VENOUS BLD VENIPUNCTURE: CPT

## 2024-07-20 ENCOUNTER — OFFICE VISIT (OUTPATIENT)
Dept: FAMILY MEDICINE CLINIC | Facility: CLINIC | Age: 34
End: 2024-07-20
Payer: COMMERCIAL

## 2024-07-20 VITALS
TEMPERATURE: 97 F | HEART RATE: 78 BPM | WEIGHT: 133 LBS | BODY MASS INDEX: 25.11 KG/M2 | DIASTOLIC BLOOD PRESSURE: 68 MMHG | HEIGHT: 61 IN | SYSTOLIC BLOOD PRESSURE: 100 MMHG | RESPIRATION RATE: 14 BRPM

## 2024-07-20 DIAGNOSIS — R35.0 FREQUENT URINATION: ICD-10-CM

## 2024-07-20 DIAGNOSIS — D50.9 IRON DEFICIENCY ANEMIA, UNSPECIFIED IRON DEFICIENCY ANEMIA TYPE: Primary | ICD-10-CM

## 2024-07-20 PROCEDURE — 99213 OFFICE O/P EST LOW 20 MIN: CPT | Performed by: FAMILY MEDICINE

## 2024-07-20 NOTE — PROGRESS NOTES
Stacy Salvador is a 34 year old female.  Iron deficiency anemia, urinary frequency, hematuria  HPI:   Patient is coming for follow-up on iron deficiency anemia.  She is taking iron supplement blood pressure high iron levels saturation came back normal but ferritin level still low.  Will stop iron supplement continue recheck blood work in 3 months.  Patient said that her menses are not iron rich foods.  Patient said that her menses are not very heavy.    Patient has some urinary frequency going to the bathroom a lot especially at night it bothers her waking up probably twice at night.  She had some blood in the urine previously but it came back normal.  Ultrasound of the kidneys and bladder came back unremarkable.  Patient will monitor symptoms and try to keep a log of her urinary symptoms.  If no improvement we may need to have patient to see urologist.  Current Outpatient Medications   Medication Sig Dispense Refill   • XOLAIR 150 MG/ML Subcutaneous Solution Prefilled Syringe Inject 300 mg into the skin every 28 days. 6 mL 0   • tretinoin 0.025 % External Cream      • triamcinolone 0.1 % External Cream APPLY TO THE ABDOMEN, FLANK, AND BACK TWICE DAILY AS NEEDED        History reviewed. No pertinent past medical history.   Social History:  Social History     Socioeconomic History   • Marital status:    Tobacco Use   • Smoking status: Never     Passive exposure: Never   • Smokeless tobacco: Never   Vaping Use   • Vaping status: Never Used   Substance and Sexual Activity   • Alcohol use: Yes   • Drug use: Never   • Sexual activity: Yes     Partners: Female        REVIEW OF SYSTEMS:   GENERAL HEALTH: feels well otherwise  SKIN: denies any unusual skin lesions or rashes  HEENT no congestion no runny nose no sore throat  Neck no neck pain  RESPIRATORY: denies shortness of breath with exertion  CARDIOVASCULAR: denies chest pain on exertion  GI: denies abdominal pain and denies heartburn  NEURO: denies headaches     urinary frequency nocturia  Psych normal mood    EXAM:   /68 (BP Location: Right arm, Patient Position: Sitting, Cuff Size: adult)   Pulse 78   Temp 97 °F (36.1 °C) (Temporal)   Resp 14   Ht 5' 1\" (1.549 m)   Wt 133 lb (60.3 kg)   LMP 12/12/2023 (Exact Date)   BMI 25.13 kg/m²   GENERAL: well developed, well nourished,in no apparent distress  SKIN: no rashes,no suspicious lesions  HEENT: atraumatic, normocephalic,ears and throat are clear  NECK: supple,no adenopathy,no bruits  LUNGS: clear to auscultation  CARDIO: RRR without murmur  GI: good BS's,no masses, HSM or tenderness  EXTREMITIES: no edema  Psychiatric - alert  and oriented x3, normal mood     Results for orders placed or performed in visit on 07/13/24   Ferritin   Result Value Ref Range    Ferritin 9.3 (L) 12.0 - 160.0 ng/mL   Iron And Tibc   Result Value Ref Range    Iron 189 (H) 50 - 170 ug/dL    Transferrin 370 250 - 380 mg/dL    Total Iron Binding Capacity 551 (H) 250 - 425 ug/dL    % Saturation 34 15 - 50 %   UA/M With Culture Reflex [E]    Specimen: Urine, clean catch   Result Value Ref Range    Urine Color Colorless (A) Yellow    Clarity Urine Clear Clear    Spec Gravity 1.006 1.005 - 1.030    Glucose Urine Normal Normal mg/dL    Bilirubin Urine Negative Negative    Ketones Urine Negative Negative mg/dL    Blood Urine Negative Negative    pH Urine 6.5 5.0 - 8.0    Protein Urine Negative Negative mg/dL    Urobilinogen Urine Normal Normal    Nitrite Urine Negative Negative    Leukocyte Esterase Urine 25 (A) Negative    WBC Urine 1-5 0 - 5 /HPF    RBC Urine 0-2 0 - 2 /HPF    Bacteria Urine None Seen None Seen /HPF    Squamous Epi. Cells Few (A) None Seen /HPF    Renal Tubular Epithelial Cells None Seen None Seen /HPF    Transitional Cells None Seen None Seen /HPF    Yeast Urine None Seen None Seen /HPF   Urine Culture, Routine    Specimen: Urine, clean catch   Result Value Ref Range    Urine Culture No Growth at 18-24 hrs.    CBC W/  DIFFERENTIAL   Result Value Ref Range    WBC 6.9 4.0 - 11.0 x10(3) uL    RBC 5.03 3.80 - 5.30 x10(6)uL    HGB 12.7 12.0 - 16.0 g/dL    HCT 39.8 35.0 - 48.0 %    MCV 79.1 (L) 80.0 - 100.0 fL    MCH 25.2 (L) 26.0 - 34.0 pg    MCHC 31.9 31.0 - 37.0 g/dL    RDW-SD 44.1 35.1 - 46.3 fL    RDW 15.3 (H) 11.0 - 15.0 %    .0 150.0 - 450.0 10(3)uL    Neutrophil Absolute Prelim 4.57 1.50 - 7.70 x10 (3) uL    Neutrophil Absolute 4.57 1.50 - 7.70 x10(3) uL    Lymphocyte Absolute 1.62 1.00 - 4.00 x10(3) uL    Monocyte Absolute 0.54 0.10 - 1.00 x10(3) uL    Eosinophil Absolute 0.07 0.00 - 0.70 x10(3) uL    Basophil Absolute 0.04 0.00 - 0.20 x10(3) uL    Immature Granulocyte Absolute 0.02 0.00 - 1.00 x10(3) uL    Neutrophil % 66.6 %    Lymphocyte % 23.6 %    Monocyte % 7.9 %    Eosinophil % 1.0 %    Basophil % 0.6 %    Immature Granulocyte % 0.3 %     PROCEDURE:  US KIDNEY/BLADDER (CPT=76770)       COMPARISON:  None.       INDICATIONS:  R31.21 Asymptomatic microscopic hematuria       TECHNIQUE:  Transabdominal gray scale ultrasound imaging of the bilateral kidneys and bladder was performed.  Routine technique was utilized.         PATIENT STATED HISTORY: (As transcribed by Technologist)  Patient with microscopic hematuria.            FINDINGS:        RIGHT KIDNEY   MEASUREMENTS:  8.9 x 4.7 x 9.2 cm   ECHOGENICITY:  Normal.   HYDRONEPHROSIS:  None.   CYSTS/STONES/MASSES:  None.       LEFT KIDNEY   MEASUREMENTS:  11.3 x 6.1 x 5.1 cm   ECHOGENICITY:  Normal.   HYDRONEPHROSIS:  None.   CYSTS/STONES/MASSES:  None.       BLADDER:  Normal.   OTHER:  Negative.                        Impression   CONCLUSION:  Unremarkable renal ultrasound examination.  A CT urogram may be done for further evaluation if needed.           LOCATION:  [facility]                   Dictated by (CST): Eber Dinh MD on 7/13/2024 at 1:07 PM       Finalized by (CST): Eber Dinh MD on 7/13/2024 at 1:08 PM          ASSESSMENT AND PLAN:     Encounter  Diagnoses   Name Primary?   • Iron deficiency anemia, unspecified iron deficiency anemia type Yes   • Frequent urination        Orders Placed This Encounter   Procedures   • Ferritin   • Iron And Tibc   • CBC With Differential With Platelet       Meds & Refills for this Visit:  Requested Prescriptions      No prescriptions requested or ordered in this encounter     Stop iron supplement.  Have iron rich foods in the diet.  Watch frequency of urination at night and keep log.  Do Kegel exercises.  Stay active.  Try to have 64 ounces of water a day.    Imaging & Consults:  None    The patient indicates understanding of these issues and agrees to the plan.  The patient is asked to return in 3 months.  The note was dictated using speech recognition software.  Accuracy and grammar in transcription may be subject to error.

## 2024-07-20 NOTE — PATIENT INSTRUCTIONS
Stop iron supplement.  Have iron rich foods in the diet.  Watch frequency of urination at night and keep log.  Do Kegel exercises.  Stay active.  Try to have 64 ounces of water a day.

## 2024-10-16 ENCOUNTER — PATIENT MESSAGE (OUTPATIENT)
Dept: ALLERGY | Facility: CLINIC | Age: 34
End: 2024-10-16

## 2024-10-16 DIAGNOSIS — L50.1 CHRONIC IDIOPATHIC URTICARIA: ICD-10-CM

## 2024-10-16 RX ORDER — LEVOCETIRIZINE DIHYDROCHLORIDE 5 MG/1
5 TABLET, FILM COATED ORAL EVERY EVENING
Qty: 30 TABLET | Refills: 5 | Status: SHIPPED | OUTPATIENT
Start: 2024-10-16

## 2024-10-16 RX ORDER — OMALIZUMAB 150 MG/ML
300 INJECTION, SOLUTION SUBCUTANEOUS
Qty: 6 ML | Refills: 0 | Status: SHIPPED | OUTPATIENT
Start: 2024-10-16

## 2024-10-16 NOTE — TELEPHONE ENCOUNTER
Patient contacted via telephone.     Patient informed that Dr. Paul sent a 3 month supply of Xolair to [facility].     Patient informed to check with the pharmacy to see if insurance will allow 3 month supply of Xolair to be dispensed at once.     Patient informed that Xyzal prescription was refilled, sent to [facility] in [place]. ( See 10/16/2024 Allergy Refill Request Encounter).     Patient given Dr. Paul's advice as below . . .    Daniel vyas MD Physician Signed 4:02 PM       Prescription for Xolair sent.  May purchase Xyzal over-the-counter.  [employer] cheapest prices.          Patient verbalized understanding of information given, repeated back.

## 2024-10-16 NOTE — TELEPHONE ENCOUNTER
Patient contacted via telephone.     Patient informed that a refill request was not received from [facility].     Patient asks that a prescription for Xolair be sent to [facility].     TRIAGE)    Assessment)    Patient contacted. She reports that hives have been flaring for the last 2 weeks.     Currently she reports that hives are present on chest, torso, bilateral axilla and back.     She denies edema of face/lips/tongue/throat.      Denies difficulty breathing or tightness in chest.      Able to speak in complete sentences over the telephone.     Medications)    - Patient states that she has stopped taking Xyzal as she ran out of medication.      Patient asks that a refill of Xyzal be sent to [facility] in [place].     Patient states she will restart Xyzal 5 mg tablet up to 4 times a day to control hives as per Dr. Paul's 6/3/2024 Office Visit Note.  She states that she will purchase Xyzal from over the counter to supplement the prescription of 2 times daily.     - Patient informed per Dr. Paul's 6/3/2024 Office Visit Note if Xyzal and Xolair are not effective in controlling hives, she may purchase Pepcid from over the counter and take 20 mg 2 times a day.     - Xolair 300 mg every 28 days.     Plan)    Patient to continue medications as above.    Patient to present to emergency department with difficulty breathing, swallowing, edema of face/lips/tongue/throat.         Dr. Paul,    See patient's request as below.  Patient informed that Xolair will not be covered more than once monthly.  May attempt 3 month supply.  Please see pended prescription.       Also, I’ll be taking FMLA leave to care for my spouse in the [facility] from November 28 to March 30, 2025. Would it be possible to get enough refills to cover that time and bring the medication with me?

## 2024-10-16 NOTE — TELEPHONE ENCOUNTER
Patient last seen in Allergy 6/3/2024 for . . .    Chronic idiopathic urticaria Yes     Flu vaccine need      COVID-19 vaccine series completed      Requested Prescriptions   Pending Prescriptions Disp Refills    levocetirizine 5 MG Oral Tab 30 tablet 5     Sig: Take 1 tablet (5 mg total) by mouth every evening.       Antihistamines Passed - 10/16/2024  3:58 PM        Passed - Appt in past 12 mos or next 1 mos     Recent Outpatient Visits              2 months ago Iron deficiency anemia, unspecified iron deficiency anemia type    [address], [place]    Office Visit    4 months ago Chronic idiopathic urticaria    [facility], Daniel Tinoco MD    Telemedicine    8 months ago Chronic idiopathic urticaria    [facility], [place]    Nurse Only    8 months ago Chronic idiopathic urticaria    [facility], Daniel Tinoco MD    Telemedicine    8 months ago Physical exam, annual    [address], [place]in Vega MD    Office Visit                         levocetirizine 5 MG Oral Tab 30 tablet 5 10/16/2024 --    Sig - Route: Take 1 tablet (5 mg total) by mouth every evening. - Oral    Sent to pharmacy as: Levocetirizine Dihydrochloride 5 MG Oral Tablet (Xyzal)    E-Prescribing Status: Receipt confirmed by pharmacy (10/16/2024  3:59 PM CDT)      Pharmacy    [id number] - [place] [phone], [phone]     Prescription as above sent to pharmacy per protocol.

## 2024-10-28 DIAGNOSIS — L50.1 CHRONIC IDIOPATHIC URTICARIA: ICD-10-CM

## 2024-10-28 RX ORDER — OMALIZUMAB 150 MG/ML
300 INJECTION, SOLUTION SUBCUTANEOUS
Qty: 6 ML | Refills: 0 | Status: SHIPPED | OUTPATIENT
Start: 2024-10-28

## 2024-10-28 NOTE — TELEPHONE ENCOUNTER
Patient last seen in Allergy 6/3/2024 for . . .    Chronic idiopathic urticaria Yes     Flu vaccine need      COVID-19 vaccine series completed        Requested Prescriptions   Pending Prescriptions Disp Refills    XOLAIR 150 MG/ML Subcutaneous Solution Prefilled Syringe [Pharmacy Med Name: Xolair 150 mg/mL subcutaneous syringe] 6 mL 0     Sig: Inject 300 mg into the skin every 28 days.       Biologic Medications Passed - 10/28/2024  4:48 PM        Passed - Appt in past 6 mos or next 3 mos     Recent Outpatient Visits              3 months ago Iron deficiency anemia, unspecified iron deficiency anemia type    [address] Aylin Gasca MD    Office Visit    4 months ago Chronic idiopathic urticaria    [facility], Daniel Tinoco MD    Telemedicine    8 months ago [facility]    8 months ago Chronic idiopathic urticaria    [facility], Daniel Tinoco MD    Telemedicine    9 months ago Physical exam, annual    [address] Aylin Gasca MD    Office Visit                         Omalizumab (XOLAIR) 150 MG/ML Subcutaneous Solution Prefilled Syringe 6 mL 0 10/28/2024 --    Sig - Route: Inject 300 mg into the skin every 28 days. Please schedule a follow-up appointment with Dr. Paul (for 12/2024) in order to receive further refills. - Subcutaneous    Sent to pharmacy as: Xolair 150 MG/ML Subcutaneous Solution Prefilled Syringe (Omalizumab)    E-Prescribing Status: Sent to pharmacy (10/28/2024  4:50 PM CDT)    No prior authorization was found for this prescription.    Found prior authorization for another prescription for the same medication: Closed - Prior Authorization duplicate/in process      Associated Diagnoses    Chronic idiopathic urticaria         Pharmacy    [facility] - [place], [place] - [address] [place] [address], [address] [phone], [phone]     Prescription as above sent to pharmacy per protocol.

## 2024-11-05 ENCOUNTER — TELEPHONE (OUTPATIENT)
Dept: ALLERGY | Facility: CLINIC | Age: 34
End: 2024-11-05

## 2024-11-05 NOTE — TELEPHONE ENCOUNTER
Spoke with patient. Verified name and .offered patient a telemed visit for 24 at 3:45 pm to discuss Xolair. Patient verbalizes understanding.

## 2024-11-05 NOTE — TELEPHONE ENCOUNTER
Patient would like a video follow up appointment for her Xolair before she leaves for the [facility] on 11/28/24.   The doctor's first available appointment is in February, 2025.

## 2024-11-06 ENCOUNTER — TELEPHONE (OUTPATIENT)
Dept: ALLERGY | Facility: CLINIC | Age: 34
End: 2024-11-06

## 2024-11-06 ENCOUNTER — TELEMEDICINE (OUTPATIENT)
Dept: ALLERGY | Facility: CLINIC | Age: 34
End: 2024-11-06
Payer: COMMERCIAL

## 2024-11-06 DIAGNOSIS — Z23 FLU VACCINE NEED: ICD-10-CM

## 2024-11-06 DIAGNOSIS — L50.1 CHRONIC IDIOPATHIC URTICARIA: Primary | ICD-10-CM

## 2024-11-06 DIAGNOSIS — Z92.29 COVID-19 VACCINE SERIES COMPLETED: ICD-10-CM

## 2024-11-06 NOTE — PROGRESS NOTES
Stacy Salvador is a 34 year old female.    HPI:   No chief complaint on file.    Patient is a 34-year-old female who presents for follow-up via video visit  Patient last seen by me on Maite 3, 2024 for chronic idiopathic urticaria    This visit is conducted using Telemedicine with live, interactive video and audio during this Coronavirus pandemic.     Please note that the following visit was completed using two-way, real-time interactive audio and/or video communication.  This has been done in good cookie to provide continuity of care in the best interest of the provider-patient relationship, due to the ongoing public health crisis/national emergency and because of restrictions of visitation.  There are limitations of this visit as no physical exam could be performed.  Every conscious effort was taken to allow for sufficient and adequate time.  This billing was spent on reviewing labs, medications, radiology tests and decision making.  Appropriate medical decision-making and tests are ordered as detailed in the plan of care below.    Medication listed include Xolair Xyzal triamcinolone    Today patient reports    Chronic idiopathic urticaria  Active or persistent symptoms  ED visits or prednisone in the interim  Active meds: Xolair, Xyzal        HISTORY:  No past medical history on file.   No past surgical history on file.   Family History   Problem Relation Age of Onset    Obesity Father     Other (gout) Father     Other (Other) Father     Hypertension Father     Prostate Cancer Father     Other (chronic kidney disease) Mother     Other (Other) Mother     Hypertension Mother     Heart Disorder Maternal Grandmother     Diabetes Maternal Grandmother     Heart Disorder Maternal Grandfather     Other (Alzheimers) Paternal Grandmother     Other (gout) Paternal Grandfather     Other (gout) Brother       Social History:   Social History     Socioeconomic History    Marital status:    Tobacco Use    Smoking status:  Never     Passive exposure: Never    Smokeless tobacco: Never   Vaping Use    Vaping status: Never Used   Substance and Sexual Activity    Alcohol use: Yes    Drug use: Never    Sexual activity: Yes     Partners: Female        Medications (Active prior to today's visit):  Current Outpatient Medications   Medication Sig Dispense Refill    Omalizumab (XOLAIR) 150 MG/ML Subcutaneous Solution Prefilled Syringe Inject 300 mg into the skin every 28 days. Please schedule a follow-up appointment with Dr. Paul (for 12/2024) in order to receive further refills. 6 mL 0    levocetirizine 5 MG Oral Tab Take 1 tablet (5 mg total) by mouth every evening. 30 tablet 5    tretinoin 0.025 % External Cream       triamcinolone 0.1 % External Cream APPLY TO THE ABDOMEN, FLANK, AND BACK TWICE DAILY AS NEEDED         Allergies:  Allergies[1]      ROS:   Allergic/Immuno:  See hpi  Cardiovascular:  Negative for irregular heartbeat/palpitations, chest pain, edema  Constitutional:  Negative night sweats,weight loss, irritability and lethargy  ENMT:  Negative for ear drainage, hearing loss and nasal drainage  Eyes:  Negative for eye discharge and vision loss  Gastrointestinal:  Negative for abdominal pain, diarrhea and vomiting  Integumentary:  Negative for pruritus and rash  Respiratory:  Negative for cough, dyspnea and wheezing    PHYSICAL EXAM:   Constitutional: responsive, no acute distress noted  Head/Face: NC/Atraumatic  TSpeaking in full sentences no increased work of breathing  A&O x 3    ASSESSMENT/PLAN:   Assessment   Encounter Diagnoses   Name Primary?    Chronic idiopathic urticaria Yes    Flu vaccine need     COVID-19 vaccine series completed        #1 chronic idiopathic urticaria    2.  Flu vaccine recommended in the fall.  Flu vaccine offered    3.  COVID-vaccine booster recommended.  Most recent booster available since September 2024  Please check with local pharmacy as we do not stock the vaccine in office.         Orders This  Visit:  No orders of the defined types were placed in this encounter.      Meds This Visit:  Requested Prescriptions      No prescriptions requested or ordered in this encounter       Imaging & Referrals:  None     11/6/2024  Daniel Paul MD    If medication samples were provided today, they were provided solely for patient education and training related to self administration of these medications.  Teaching, instruction and sample was provided to the patient by myself.  Teaching included  a review of potential adverse side effects as well as potential efficacy.  Patient's questions were answered in regards to medication administration and dosing and potential side effects. Teaching was provided via the teach back method         [1] No Known Allergies

## 2024-11-06 NOTE — TELEPHONE ENCOUNTER
Patient checked into 3:45PM Video Visit at 4:59PM.  Dr. Paul left office for the day.    Pt rescheduled for 11/14 as a Video Visit.

## 2024-11-14 ENCOUNTER — TELEMEDICINE (OUTPATIENT)
Dept: ALLERGY | Facility: CLINIC | Age: 34
End: 2024-11-14

## 2024-11-14 DIAGNOSIS — Z23 FLU VACCINE NEED: ICD-10-CM

## 2024-11-14 DIAGNOSIS — L50.1 CHRONIC IDIOPATHIC URTICARIA: Primary | ICD-10-CM

## 2024-11-14 DIAGNOSIS — Z92.29 COVID-19 VACCINE SERIES COMPLETED: ICD-10-CM

## 2024-11-14 PROCEDURE — 99214 OFFICE O/P EST MOD 30 MIN: CPT | Performed by: ALLERGY & IMMUNOLOGY

## 2024-11-14 NOTE — PROGRESS NOTES
Immunizations reviewed.  COVID-vaccine x 3 doses last in October 2021  Last flu vaccine from 2021 April Kellen Salvador is a 34 year old female.    HPI:   No chief complaint on file.    Patient is a 34-year-old female who presents for follow-up via video visit    This visit is conducted using Telemedicine with live, interactive video and audio during this Coronavirus pandemic.     Please note that the following visit was completed using two-way, real-time interactive audio and/or video communication.  This has been done in good cookie to provide continuity of care in the best interest of the provider-patient relationship, due to the ongoing public health crisis/national emergency and because of restrictions of visitation.  There are limitations of this visit as no physical exam could be performed.  Every conscious effort was taken to allow for sufficient and adequate time.  This billing was spent on reviewing labs, medications, radiology tests and decision making.  Appropriate medical decision-making and tests are ordered as detailed in the plan of care below       Patient was a no-show for her last visit on November 6, 2024    Patient last seen by me in June 2024  History of chronic idiopathic urticaria  Medication list include Xyzal and Xolair.  Xolair dosing is 300 mg every 4 weeks    Patient with a history of chronic idiopathic urticaria for 10+ years.  Xolair started in December 2023 for chronic idiopathic urticaria  Medications also include EpiPen and Xyzal  Xolair dosing is 300 mg every 4 weeks     Today patient reports     Ciu   Active or persistent symptoms: Denies  Better in interim  Still come and goes   ED visits or prednisone in the interim denies  Active meds: Xyzal, Xolair 300 mg every 4 weeks  Xolair helping  50% better     Headed to [employer]  x 16 weeks , leaving Nov 28th     Xolair at home         HISTORY:  No past medical history on file.   No past surgical history on file.   Family History   Problem  Relation Age of Onset    Obesity Father     Other (gout) Father     Other (Other) Father     Hypertension Father     Prostate Cancer Father     Other (chronic kidney disease) Mother     Other (Other) Mother     Hypertension Mother     Heart Disorder Maternal Grandmother     Diabetes Maternal Grandmother     Heart Disorder Maternal Grandfather     Other (Alzheimers) Paternal Grandmother     Other (gout) Paternal Grandfather     Other (gout) Brother       Social History:   Social History     Socioeconomic History    Marital status:    Tobacco Use    Smoking status: Never     Passive exposure: Never    Smokeless tobacco: Never   Vaping Use    Vaping status: Never Used   Substance and Sexual Activity    Alcohol use: Yes    Drug use: Never    Sexual activity: Yes     Partners: Female        Medications (Active prior to today's visit):  Current Outpatient Medications   Medication Sig Dispense Refill    Omalizumab (XOLAIR) 150 MG/ML Subcutaneous Solution Prefilled Syringe Inject 300 mg into the skin every 28 days. Please schedule a follow-up appointment with Dr. Paul (for 12/2024) in order to receive further refills. 6 mL 0    levocetirizine 5 MG Oral Tab Take 1 tablet (5 mg total) by mouth every evening. 30 tablet 5    tretinoin 0.025 % External Cream       triamcinolone 0.1 % External Cream APPLY TO THE ABDOMEN, FLANK, AND BACK TWICE DAILY AS NEEDED         Allergies:  Allergies[1]      ROS:   Allergic/Immuno:  See hpi  Cardiovascular:  Negative for irregular heartbeat/palpitations, chest pain, edema  Constitutional:  Negative night sweats,weight loss, irritability and lethargy  ENMT:  Negative for ear drainage, hearing loss and nasal drainage  Eyes:  Negative for eye discharge and vision loss  Gastrointestinal:  Negative for abdominal pain, diarrhea and vomiting  Integumentary:  Negative for pruritus and rash  Respiratory:  Negative for cough, dyspnea and wheezing    PHYSICAL EXAM:   Constitutional: responsive,  no acute distress noted  Head/Face: NC/Atraumatic  Speaking in full sentences no increased work of breathing  A&O x 3       ASSESSMENT/PLAN:   Assessment   Encounter Diagnoses   Name Primary?    Chronic idiopathic urticaria Yes    Flu vaccine need     COVID-19 vaccine series completed      #1 chronic idiopathic urticaria  Overall Xolair has been helpful in decreasing symptoms in frequency and severity of hives.  No ED visits or prednisone in the interim.  Recommend using Xyzal twice a day from her current once a day and reassess over the next 2 weeks for intermittent hives.  No ED visits or prednisone in the interim  Patient reports that she reports leaving for the [employer] at the end of November will be gone for approximately 4 months.  If Xolair is not capable being brought on her trip to the [facility] would rely on Xyzal up to 4 times per day to control her hives.    2.  Flu vaccine recommended and offered  Flu vaccine up-to-date through work as she works for [employer].    3.  COVID vaccines reviewed.  Recommend booster.  Most recent booster available since September 2024.  Please check with local pharmacy           Orders This Visit:  No orders of the defined types were placed in this encounter.      Meds This Visit:  Requested Prescriptions      No prescriptions requested or ordered in this encounter       Imaging & Referrals:  None     11/14/2024  Daniel Paul MD    If medication samples were provided today, they were provided solely for patient education and training related to self administration of these medications.  Teaching, instruction and sample was provided to the patient by myself.  Teaching included  a review of potential adverse side effects as well as potential efficacy.  Patient's questions were answered in regards to medication administration and dosing and potential side effects. Teaching was provided via the teach back method           [1] No Known Allergies

## 2024-11-19 ENCOUNTER — PATIENT MESSAGE (OUTPATIENT)
Dept: ALLERGY | Facility: CLINIC | Age: 34
End: 2024-11-19

## 2024-11-19 ENCOUNTER — TELEPHONE (OUTPATIENT)
Dept: ALLERGY | Facility: CLINIC | Age: 34
End: 2024-11-19

## 2024-11-19 NOTE — TELEPHONE ENCOUNTER
Dr. Paul asked that patient be given Xolair 300 mg/2 mL prefilled syringe sample to administer at home.     Xolair 300 mg/2 mL prefilled syringe sample prepared for patient to , labeled with patient's name, , GE and Sig.      Patient states she will send a [employer] message when she is able to  the sample.       Below pamphlet included with Sample:    Omalizumab Injection  Brands: Xolair  Uses  This medicine is used for the following purposes:  asthma  nasal polyps  skin rash  Instructions  The medicine needs to be mixed before being injected.  Keep medicine in refrigerator. Do not freeze. If frozen, throw away.  Protect medicine from light.  Take the medicine out of the refrigerator about 30 minutes before use to warm to room temperature.  Inject the medicine immediately after mixing.  Injecting cold drug may be uncomfortable.  Never use any medicine that has .  It may take several weeks for this medicine to fully work.  It is important that you keep taking each dose of this medicine on time even if you are feeling well.  If you miss a dose, contact your doctor for instructions.  Drug interactions can change how medicines work or increase risk for side effects. Tell your health care providers about all medicines taken. Include prescription and over-the-counter medicines, vitamins, and herbal medicines. Speak with your doctor or pharmacist before starting or stopping any medicine.  Tell your doctor if symptoms do not get better or if they get worse.  Keep all appointments for medical exams and tests while on this medicine.  Cautions  Tell your doctor and pharmacist if you ever had an allergic reaction to a medicine.  Do not use the medication any more than instructed.  Tell the doctor or pharmacist if you are pregnant, planning to be pregnant, or breastfeeding.  Ask your pharmacist how to properly throw away used needles or syringes.  Do not share this medicine with anyone who has not been  prescribed this medicine.  Some patients have serious side effects from this medicine. Ask your pharmacist to show you the information from the Food and Drug Administration (FDA) and discuss it with you.  Side Effects  The following is a list of some common side effects from this medicine. Please speak with your doctor about what you should do if you experience these or other side effects.  increased risk of cancer  headaches  pain, redness, swelling near injection  Call your doctor or get medical help right away if you notice any of these more serious side effects:  severe allergic reaction  chest or jaw pain  confusion  cough that does not go away  dizziness  swelling of the face, mouth, tongue or throat  swelling of the legs, feet, and hands  fainting  fever or chills  numbness or tingling in hands and feet  itching  joint or muscle pain  unusual growth or lump on the neck  red, burning, or itchy skin  shortness of breath  skin changes - a darker area with uneven edges or coloring  change in the size or color of a skin mole  difficulty speaking  symptoms of stroke (such as one-sided weakness, slurred speech, confusion)  sweating  blurring or changes of vision  A few people may have an allergic reaction to this medicine. Symptoms can include difficulty breathing, skin rash, itching, swelling, or severe dizziness. If you notice any of these symptoms, seek medical help quickly.  Extra  Please speak with your doctor, nurse, or pharmacist if you have any questions about this medicine.  [email or web address][email or web address]  IMPORTANT NOTE: This document tells you briefly how to take your medicine, but it does not tell you all there is to know about it. Your doctor or pharmacist may give you other documents about your medicine. Please talk to them if you have any questions. Always follow their advice. There is a more complete description of this medicine available in English. Scan this code on your smartphone or  tablet or use the web address below. You can also ask your pharmacist for a printout. If you have any questions, please ask your pharmacist. The display and use of this drug information is subject to Terms of Use. Copyright(c) 2023 [employer]   © 7071-4950 [employer]. All rights reserved. This information is not intended as a substitute for professional medical care. Always follow your healthcare professional's instructions.

## 2024-11-21 ENCOUNTER — TELEPHONE (OUTPATIENT)
Dept: ALLERGY | Facility: CLINIC | Age: 34
End: 2024-11-21

## 2024-11-21 NOTE — TELEPHONE ENCOUNTER
[facility] contacted at [phone].  RN spoke with prior authorization representative, Veronica.     Attempted to completed a vacation exemption for 3 month supply of Xolair.     Rep reports that [employer] cannot rubén an override.  Pharmacy must call [employer] help desk to ask for exemption: [phone].       Rep states that there is a refill too soon until 12/17/2024.

## 2024-11-21 NOTE — TELEPHONE ENCOUNTER
Pharmacy called to advise the office will need to fill out a Patient Level Authorization Form (YENNY) on 'cover my meds.' And then the pharmacy can call back to request a vacation override for patient.     Please see telephone encounter 11/19.

## 2024-11-21 NOTE — TELEPHONE ENCOUNTER
Melissa Arzate  Signed 12:33 PM     Copy     Pharmacy called to advise the office will need to fill out a Patient Level Authorization Form (YENNY) on 'cover my meds.' And then the pharmacy can call back to request a vacation override for patient.      Please see telephone encounter 11/19.          See Note above from 11/21/2024 Allergy Telephone Encounter.     [facility] contacted at [phone].  RN spoke with prior authorization representative,Keisha.     Attempted to completed a vacation exemption for 3 month supply of Xolair.      Rep reports that [email or web address] cannot rubén an override.  Pharmacy must call [employer] help desk to ask for exemption: [phone]. Rep asked for a YENNY form for the Xolair as patient needs 3 month supply when traveling out of the country [place] 3 months.     Rep reports that the [email or web address] Help Desk will need to rubén the override, there is no form to complete other than the general prior authorization form. Patient currently has an active prior authorization for Xolair         Rep states that there is a refill too soon until 12/19/2024.    [facility] contacted at [phone]. RN spoke with Nohemi in the pharmacy.      Explained that nurse called [facility] requesting vacation override for 3 month supply of Xolair.      Given information ascertained from [email or web address] as below . . .     Rep reports that [employer] cannot rubén an override.  Pharmacy must call [employer] help desk to ask for exemption: [phone].         Rep states that there is a refill too soon until 12/19/2024.        Nohemi offers that she will call the [email or web address] help desk at number above to request override.

## 2024-11-21 NOTE — TELEPHONE ENCOUNTER
[facility] contacted at [phone]. RN spoke with Nohemi in the pharmacy.     Explained that nurse called [employer] requesting vacation override for 3 month supply of Xolair.     Given information ascertained from [employer] as below . . .    Rep reports that [employer] cannot rubén an override.  Pharmacy must call [employer] help desk to ask for exemption: [phone].         Rep states that there is a refill too soon until 12/17/2024.      Nohemi offers that she will call the [employer] help desk at number above to request override.

## 2024-11-25 NOTE — TELEPHONE ENCOUNTER
[facility] contacted.      Pharmacy tech offers that Nohemi was able to gain a vacation override for the patient's Xolair.     3 months of Xolair will be prepared for patient to pick-up from pharmacy.

## 2024-11-25 NOTE — TELEPHONE ENCOUNTER
See 11/19/2024 Allergy Patient Message Encounter.  Patient declined need for Xolair Sample.  Xolair sample never released to patient.

## 2024-11-26 ENCOUNTER — LAB ENCOUNTER (OUTPATIENT)
Dept: LAB | Age: 34
End: 2024-11-26
Attending: FAMILY MEDICINE
Payer: COMMERCIAL

## 2024-11-26 ENCOUNTER — OFFICE VISIT (OUTPATIENT)
Dept: FAMILY MEDICINE CLINIC | Facility: CLINIC | Age: 34
End: 2024-11-26
Payer: COMMERCIAL

## 2024-11-26 VITALS
RESPIRATION RATE: 16 BRPM | HEART RATE: 100 BPM | TEMPERATURE: 98 F | SYSTOLIC BLOOD PRESSURE: 126 MMHG | WEIGHT: 134 LBS | HEIGHT: 61 IN | BODY MASS INDEX: 25.3 KG/M2 | DIASTOLIC BLOOD PRESSURE: 80 MMHG

## 2024-11-26 DIAGNOSIS — D50.9 IRON DEFICIENCY ANEMIA, UNSPECIFIED IRON DEFICIENCY ANEMIA TYPE: ICD-10-CM

## 2024-11-26 DIAGNOSIS — D50.9 IRON DEFICIENCY ANEMIA, UNSPECIFIED IRON DEFICIENCY ANEMIA TYPE: Primary | ICD-10-CM

## 2024-11-26 LAB
BASOPHILS # BLD AUTO: 0.03 X10(3) UL (ref 0–0.2)
BASOPHILS NFR BLD AUTO: 0.4 %
DEPRECATED HBV CORE AB SER IA-ACNC: 7 NG/ML
EOSINOPHIL # BLD AUTO: 0.18 X10(3) UL (ref 0–0.7)
EOSINOPHIL NFR BLD AUTO: 2.5 %
ERYTHROCYTE [DISTWIDTH] IN BLOOD BY AUTOMATED COUNT: 14.3 %
HCT VFR BLD AUTO: 38.4 %
HGB BLD-MCNC: 12.2 G/DL
IMM GRANULOCYTES # BLD AUTO: 0.03 X10(3) UL (ref 0–1)
IMM GRANULOCYTES NFR BLD: 0.4 %
IRON SATN MFR SERPL: 6 %
IRON SERPL-MCNC: 30 UG/DL
LYMPHOCYTES # BLD AUTO: 1.61 X10(3) UL (ref 1–4)
LYMPHOCYTES NFR BLD AUTO: 22 %
MCH RBC QN AUTO: 25.4 PG (ref 26–34)
MCHC RBC AUTO-ENTMCNC: 31.8 G/DL (ref 31–37)
MCV RBC AUTO: 80 FL
MONOCYTES # BLD AUTO: 0.55 X10(3) UL (ref 0.1–1)
MONOCYTES NFR BLD AUTO: 7.5 %
NEUTROPHILS # BLD AUTO: 4.93 X10 (3) UL (ref 1.5–7.7)
NEUTROPHILS # BLD AUTO: 4.93 X10(3) UL (ref 1.5–7.7)
NEUTROPHILS NFR BLD AUTO: 67.2 %
PLATELET # BLD AUTO: 342 10(3)UL (ref 150–450)
RBC # BLD AUTO: 4.8 X10(6)UL
TOTAL IRON BINDING CAPACITY: 463 UG/DL (ref 250–425)
TRANSFERRIN SERPL-MCNC: 373 MG/DL (ref 250–380)
WBC # BLD AUTO: 7.3 X10(3) UL (ref 4–11)

## 2024-11-26 PROCEDURE — 99213 OFFICE O/P EST LOW 20 MIN: CPT | Performed by: FAMILY MEDICINE

## 2024-11-26 PROCEDURE — 83540 ASSAY OF IRON: CPT

## 2024-11-26 PROCEDURE — 83550 IRON BINDING TEST: CPT

## 2024-11-26 PROCEDURE — 85025 COMPLETE CBC W/AUTO DIFF WBC: CPT

## 2024-11-26 PROCEDURE — 36415 COLL VENOUS BLD VENIPUNCTURE: CPT

## 2024-11-26 PROCEDURE — 82728 ASSAY OF FERRITIN: CPT

## 2024-11-26 NOTE — PROGRESS NOTES
Stacy Salvador is a 34 year old female.  Iron deficiency anemia  HPI:   Patient is following up on iron deficiency anemia.  She stopped taking iron supplement this summer.  Feeling well asymptomatic.  Menses are not too heavy.  No blood in the stool.  Will check blood work today make further recommendations.  Patient was complaining of urinary frequency especially at night , stopped  drinking tea at night and she is doing better.    Current Outpatient Medications   Medication Sig Dispense Refill    Omalizumab (XOLAIR) 150 MG/ML Subcutaneous Solution Prefilled Syringe Inject 300 mg into the skin every 28 days. Please schedule a follow-up appointment with Dr. Paul (for 12/2024) in order to receive further refills. 6 mL 0    levocetirizine 5 MG Oral Tab Take 1 tablet (5 mg total) by mouth every evening. 30 tablet 5    tretinoin 0.025 % External Cream       triamcinolone 0.1 % External Cream APPLY TO THE ABDOMEN, FLANK, AND BACK TWICE DAILY AS NEEDED        No past medical history on file.   Social History:  Social History     Socioeconomic History    Marital status:    Tobacco Use    Smoking status: Never     Passive exposure: Never    Smokeless tobacco: Never   Vaping Use    Vaping status: Never Used   Substance and Sexual Activity    Alcohol use: Yes    Drug use: Never    Sexual activity: Yes     Partners: Female        REVIEW OF SYSTEMS:   GENERAL HEALTH: feels well otherwise  SKIN: denies any unusual skin lesions or rashes  HEENT no congestion no runny nose no sore throat  Neck no neck pain   RESPIRATORY: denies shortness of breath with exertion  CARDIOVASCULAR: denies chest pain on exertion  GI: denies abdominal pain and denies heartburn  NEURO: denies headaches  Psych normal mood.    EXAM:   /80 (BP Location: Left arm, Patient Position: Sitting, Cuff Size: adult)   Pulse 100   Temp 97.8 °F (36.6 °C) (Temporal)   Resp 16   Ht 5' 1\" (1.549 m)   Wt 134 lb (60.8 kg)   LMP 12/12/2023 (Exact Date)    BMI 25.32 kg/m²   GENERAL: well developed, well nourished,in no apparent distress  SKIN: no rashes,no suspicious lesions  HEENT: atraumatic, normocephalic,  NECK: supple,no adenopathy,  LUNGS: clear to auscultation  CARDIO: RRR without murmur  GI: good BS's,no masses, HSM or tenderness  EXTREMITIES: no edema  Psychiatric - alert  and oriented x3, normal mood      Blood work done today reviewed no anemia.  Iron level came back low ferritin, iron saturation 6, ferritin 7.    ASSESSMENT AND PLAN:     Encounter Diagnosis   Name Primary?    Iron deficiency anemia, unspecified iron deficiency anemia type Yes       Orders Placed This Encounter   Procedures    Iron And Tibc [E]    Ferritin [E]    CBC W Differential W Platelet [E]       Meds & Refills for this Visit:  Requested Prescriptions      No prescriptions requested or ordered in this encounter   Take iron supplement ferrous sulfate 325 mg 1 tablet daily with vitamin C 500 mg daily.  Healthy well-balanced and rich iron food.  Will monitor blood work recheck blood test in March prior next visit.    Imaging & Consults:  None    The patient indicates understanding of these issues and agrees to the plan.  The patient is asked to return in March 2025.  The note was dictated using speech recognition software.  Accuracy and grammar in transcription may be subject to error.

## 2024-12-26 DIAGNOSIS — L50.1 CHRONIC IDIOPATHIC URTICARIA: ICD-10-CM

## 2024-12-26 RX ORDER — OMALIZUMAB 150 MG/ML
300 INJECTION, SOLUTION SUBCUTANEOUS
Qty: 6 ML | Refills: 0 | Status: SHIPPED | OUTPATIENT
Start: 2024-12-26

## 2024-12-26 NOTE — TELEPHONE ENCOUNTER
Patient last seen in Allergy 11/14/2024 for physician visit . . .      Chronic idiopathic urticaria Yes     Flu vaccine need      COVID-19 vaccine series completed      Requested Prescriptions   Pending Prescriptions Disp Refills    XOLAIR 150 MG/ML Subcutaneous Solution Prefilled Syringe [Pharmacy Med Name: Xolair 150 mg/mL subcutaneous syringe] 6 mL 0     Sig: Inject 300 mg into the skin every 28 days. Please schedule a follow-up appointment with Dr. Paul (for 12/2024) in order to receive further refills.       Biologic Medications Passed - 12/26/2024  4:33 PM        Passed - Appt in past 6 mos or next 3 mos     Recent Outpatient Visits              1 month ago Iron deficiency anemia, unspecified iron deficiency anemia type    [address]    1 month ago Chronic idiopathic urticaria    [facility], Daniel Tinoco MD    Telemedicine    1 month ago Chronic idiopathic urticaria    [facility], Daniel Tinoco MD    Telemedicine    5 months ago Iron deficiency anemia, unspecified iron deficiency anemia type    [address]    Office Visit    6 months ago Chronic idiopathic urticaria    [facility], Daniel Tinoco MD    Telemedicine                         Omalizumab (XOLAIR) 150 MG/ML Subcutaneous Solution Prefilled Syringe 6 mL 0 12/26/2024 --    Sig - Route: Inject 300 mg into the skin every 28 days. - Subcutaneous    Sent to pharmacy as: Xolair 150 MG/ML Subcutaneous Solution Prefilled Syringe (Omalizumab)    E-Prescribing Status: Sent to pharmacy (12/26/2024  4:35 PM CST)    No prior authorization was found for this prescription.    Found prior authorization for another prescription for the same medication: Closed - Prior  Authorization duplicate/in process      Associated Diagnoses    Chronic idiopathic urticaria        Pharmacy    [place], [place] - [address] [place] [address], [address] [phone], [phone]     Prescription as above sent to pharmacy per protocol.

## 2025-03-18 ENCOUNTER — LAB ENCOUNTER (OUTPATIENT)
Dept: LAB | Age: 35
End: 2025-03-18
Attending: FAMILY MEDICINE
Payer: COMMERCIAL

## 2025-03-18 DIAGNOSIS — D50.9 IRON DEFICIENCY ANEMIA, UNSPECIFIED IRON DEFICIENCY ANEMIA TYPE: ICD-10-CM

## 2025-03-18 LAB
BASOPHILS # BLD AUTO: 0.03 X10(3) UL (ref 0–0.2)
BASOPHILS NFR BLD AUTO: 0.4 %
DEPRECATED HBV CORE AB SER IA-ACNC: 21 NG/ML
EOSINOPHIL # BLD AUTO: 0.15 X10(3) UL (ref 0–0.7)
EOSINOPHIL NFR BLD AUTO: 1.8 %
ERYTHROCYTE [DISTWIDTH] IN BLOOD BY AUTOMATED COUNT: 13.6 %
HCT VFR BLD AUTO: 45.3 %
HGB BLD-MCNC: 15.1 G/DL
IMM GRANULOCYTES # BLD AUTO: 0.05 X10(3) UL (ref 0–1)
IMM GRANULOCYTES NFR BLD: 0.6 %
IRON SATN MFR SERPL: 15 %
IRON SERPL-MCNC: 64 UG/DL
LYMPHOCYTES # BLD AUTO: 1.92 X10(3) UL (ref 1–4)
LYMPHOCYTES NFR BLD AUTO: 23.3 %
MCH RBC QN AUTO: 27.9 PG (ref 26–34)
MCHC RBC AUTO-ENTMCNC: 33.3 G/DL (ref 31–37)
MCV RBC AUTO: 83.6 FL
MONOCYTES # BLD AUTO: 0.54 X10(3) UL (ref 0.1–1)
MONOCYTES NFR BLD AUTO: 6.6 %
NEUTROPHILS # BLD AUTO: 5.55 X10 (3) UL (ref 1.5–7.7)
NEUTROPHILS # BLD AUTO: 5.55 X10(3) UL (ref 1.5–7.7)
NEUTROPHILS NFR BLD AUTO: 67.3 %
PLATELET # BLD AUTO: 322 10(3)UL (ref 150–450)
RBC # BLD AUTO: 5.42 X10(6)UL
TOTAL IRON BINDING CAPACITY: 437 UG/DL (ref 250–425)
TRANSFERRIN SERPL-MCNC: 339 MG/DL (ref 250–380)
WBC # BLD AUTO: 8.2 X10(3) UL (ref 4–11)

## 2025-03-18 PROCEDURE — 36415 COLL VENOUS BLD VENIPUNCTURE: CPT

## 2025-03-18 PROCEDURE — 82728 ASSAY OF FERRITIN: CPT

## 2025-03-18 PROCEDURE — 83550 IRON BINDING TEST: CPT

## 2025-03-18 PROCEDURE — 83540 ASSAY OF IRON: CPT

## 2025-03-18 PROCEDURE — 85025 COMPLETE CBC W/AUTO DIFF WBC: CPT

## 2025-03-19 ENCOUNTER — OFFICE VISIT (OUTPATIENT)
Dept: FAMILY MEDICINE CLINIC | Facility: CLINIC | Age: 35
End: 2025-03-19
Payer: COMMERCIAL

## 2025-03-19 VITALS
RESPIRATION RATE: 18 BRPM | HEART RATE: 94 BPM | BODY MASS INDEX: 25.68 KG/M2 | WEIGHT: 136 LBS | SYSTOLIC BLOOD PRESSURE: 113 MMHG | HEIGHT: 61 IN | TEMPERATURE: 97 F | DIASTOLIC BLOOD PRESSURE: 85 MMHG

## 2025-03-19 DIAGNOSIS — D50.9 IRON DEFICIENCY ANEMIA, UNSPECIFIED IRON DEFICIENCY ANEMIA TYPE: Primary | ICD-10-CM

## 2025-03-19 DIAGNOSIS — Z00.00 LABORATORY EXAMINATION ORDERED AS PART OF A ROUTINE GENERAL MEDICAL EXAMINATION: ICD-10-CM

## 2025-03-19 PROCEDURE — 99213 OFFICE O/P EST LOW 20 MIN: CPT | Performed by: FAMILY MEDICINE

## 2025-03-19 NOTE — PROGRESS NOTES
Stacy Salvador is a 34 year old female.  Iron deficiency anemia  HPI:       The following individual(s) verbally consented to be recorded using ambient AI listening technology and understand that they can each withdraw their consent to this listening technology at any point by asking the clinician to turn off or pause the recording:    Patient name: Stacy Salvador    History of Present Illness  Stacy Salvador is a 34 year old female with  iron deficiency and anemia who presents for follow-up.    She has a history of anemia with recent improvements in hemoglobin levels to 15.1 and iron levels from 30 to 64. She continues to take iron supplements but occasionally misses doses. There is a family history of colon cancer, as her father had the condition. No gastrointestinal symptoms such as constipation, diarrhea, stomach upset, stomach pain, or heartburn. No blood in the stool, and previous testing for occult blood was negative.    Menstrual periods are normal, lasting four to five days, without heavy flow or soaking through pads.     Patient has twin babies daughter and son ,both in [facility].    Current Outpatient Medications   Medication Sig Dispense Refill    Omalizumab (XOLAIR) 150 MG/ML Subcutaneous Solution Prefilled Syringe Inject 300 mg into the skin every 28 days. 6 mL 0    levocetirizine 5 MG Oral Tab Take 1 tablet (5 mg total) by mouth every evening. 30 tablet 5    tretinoin 0.025 % External Cream       triamcinolone 0.1 % External Cream APPLY TO THE ABDOMEN, FLANK, AND BACK TWICE DAILY AS NEEDED        History reviewed. No pertinent past medical history.   Social History:  Social History     Socioeconomic History    Marital status:    Tobacco Use    Smoking status: Never     Passive exposure: Never    Smokeless tobacco: Never   Vaping Use    Vaping status: Never Used   Substance and Sexual Activity    Alcohol use: Yes    Drug use: Never    Sexual activity: Yes     Partners: Female        REVIEW  OF SYSTEMS:   GENERAL HEALTH: feels well otherwise  SKIN: denies any unusual skin lesions or rashes  HEENT no congestion no runny nose no sore throat  Neck no neck pain   RESPIRATORY: denies shortness of breath with exertion  CARDIOVASCULAR: denies chest pain on exertion  GI: denies abdominal pain and denies heartburn  NEURO: denies headaches  Psych normal mood.    EXAM:   /85 (BP Location: Left arm, Patient Position: Sitting, Cuff Size: adult)   Pulse 94   Temp 96.8 °F (36 °C) (Temporal)   Resp 18   Ht 5' 1\" (1.549 m)   Wt 136 lb (61.7 kg)   BMI 25.70 kg/m²   GENERAL: well developed, well nourished,in no apparent distress  SKIN: no rashes,no suspicious lesions  HEENT: atraumatic, normocephalic,  NECK: supple,no adenopathy,  LUNGS: clear to auscultation  CARDIO: RRR without murmur  EXTREMITIES: no edema  Psychiatric - alert  and oriented x3, normal mood      Results for orders placed or performed in visit on 03/18/25   Iron And Tibc [E]    Collection Time: 03/18/25 10:26 AM   Result Value Ref Range    Iron 64 50 - 170 ug/dL    Transferrin 339 250 - 380 mg/dL    Total Iron Binding Capacity 437 (H) 250 - 425 ug/dL    % Saturation 15 15 - 50 %   Ferritin [E]    Collection Time: 03/18/25 10:26 AM   Result Value Ref Range    Ferritin 21 (L) 50 - 306 ng/mL   CBC W Differential W Platelet [E]    Collection Time: 03/18/25 10:26 AM   Result Value Ref Range    WBC 8.2 4.0 - 11.0 x10(3) uL    RBC 5.42 (H) 3.80 - 5.30 x10(6)uL    HGB 15.1 12.0 - 16.0 g/dL    HCT 45.3 35.0 - 48.0 %    .0 150.0 - 450.0 10(3)uL    MCV 83.6 80.0 - 100.0 fL    MCH 27.9 26.0 - 34.0 pg    MCHC 33.3 31.0 - 37.0 g/dL    RDW 13.6 %    Neutrophil Absolute Prelim 5.55 1.50 - 7.70 x10 (3) uL    Neutrophil Absolute 5.55 1.50 - 7.70 x10(3) uL    Lymphocyte Absolute 1.92 1.00 - 4.00 x10(3) uL    Monocyte Absolute 0.54 0.10 - 1.00 x10(3) uL    Eosinophil Absolute 0.15 0.00 - 0.70 x10(3) uL    Basophil Absolute 0.03 0.00 - 0.20 x10(3) uL     Immature Granulocyte Absolute 0.05 0.00 - 1.00 x10(3) uL    Neutrophil % 67.3 %    Lymphocyte % 23.3 %    Monocyte % 6.6 %    Eosinophil % 1.8 %    Basophil % 0.4 %    Immature Granulocyte % 0.6 %        ASSESSMENT AND PLAN:     Encounter Diagnoses   Name Primary?    Iron deficiency anemia, unspecified iron deficiency anemia type Yes    Laboratory examination ordered as part of a routine general medical examination      Assessment & Plan  Iron deficiency anemia  Hemoglobin improved to 15.1. Iron and ferritin levels increased but need further improvement. Anemia likely due to low dietary iron intake. Discussed importance of dietary iron and potential to discontinue supplements if intake is sufficient.  - Continue iron supplements.  - Perform FIT test for occult blood.  - Encourage iron-rich foods.  - Advise vitamin C with iron supplements.  - Reassess iron levels during summer visit.  - Consider discontinuing supplements if dietary intake is sufficient.    General Health Maintenance  Family history of colon cancer requires regular screening.  - Perform FIT test for colon cancer screening.  - Schedule preventive visit for summer physical examination.       Orders Placed This Encounter   Procedures    CBC With Differential With Platelet    Comp Metabolic Panel (14)    Lipid Panel    Urinalysis with Culture Reflex    TSH W Reflex To Free T4    Ferritin    Iron And Tibc    Occult Blood, Fecal, Immunoassay (Blue cards) [E]       Meds & Refills for this Visit:  Requested Prescriptions      No prescriptions requested or ordered in this encounter   Take iron supplement ferrous sulfate 325 mg 1 tablet daily with vitamin C 500 mg daily.  Healthy well-balanced and rich iron food.  Will monitor blood work recheck blood test prior next visit.    Imaging & Consults:  None    The patient indicates understanding of these issues and agrees to the plan.  The patient is asked to return in July 2025.  The note was dictated using speech  recognition software.  Accuracy and grammar in transcription may be subject to error.

## 2025-03-19 NOTE — PATIENT INSTRUCTIONS
VISIT SUMMARY:  April Kellen Salvador, a 34-year-old female with thyroid deficiency and anemia, came in for a follow-up visit. Her hemoglobin and iron levels have improved, and she continues to take iron supplements.      YOUR PLAN:  -IRON DEFICIENCY ANEMIA: Iron deficiency anemia is a condition where the body lacks enough iron to produce healthy red blood cells. Your hemoglobin and iron levels have improved, but further improvement is needed. Continue taking your iron supplements and try to include more iron-rich foods in your diet. Taking vitamin C with your iron supplements can help with absorption. We will reassess your iron levels during your summer visit and may consider discontinuing supplements if your dietary intake is sufficient.    -GENERAL HEALTH MAINTENANCE: Given your family history of colon cancer, regular screening is important. We will perform a FIT test for colon cancer screening and schedule a preventive visit for your summer physical examination.    INSTRUCTIONS:  Please continue taking your iron supplements and try to include more iron-rich foods in your diet. Remember to take vitamin C with your iron supplements to help with absorption. We will perform a FIT test for occult blood and colon cancer screening. Please schedule a preventive visit for your summer physical examination to reassess your iron levels and overall health.    Contains text generated by [employer]

## 2025-05-07 ENCOUNTER — TELEPHONE (OUTPATIENT)
Dept: ALLERGY | Facility: CLINIC | Age: 35
End: 2025-05-07

## 2025-05-07 NOTE — TELEPHONE ENCOUNTER
Patient sent a [employer] message requesting a virtual appointment week of 5/12/25 with Dr. Paul; Is provider okay with scheduling a video appointment with patient?    Comments:  Refill of Xolair. Can I do virtual consultation as well?

## 2025-05-08 NOTE — TELEPHONE ENCOUNTER
Scheduled virtual visit for 5/19/25  Reminded patient to complete e check in and to log in about 5 minutes before the virtual visit  Patient verbalized understanding and denies further questions at this time

## 2025-05-19 ENCOUNTER — TELEMEDICINE (OUTPATIENT)
Dept: ALLERGY | Facility: CLINIC | Age: 35
End: 2025-05-19
Payer: COMMERCIAL

## 2025-05-19 DIAGNOSIS — Z23 NEED FOR COVID-19 VACCINE: ICD-10-CM

## 2025-05-19 DIAGNOSIS — Z23 FLU VACCINE NEED: ICD-10-CM

## 2025-05-19 DIAGNOSIS — L50.1 CHRONIC IDIOPATHIC URTICARIA: Primary | ICD-10-CM

## 2025-05-19 NOTE — PROGRESS NOTES
Stacy Salvador is a 35 year old female.    HPI:   No chief complaint on file.    Patient is a 35-year-old female who presents for follow-up via video visit    Patient last seen by me November 14, 2024    This visit is conducted using Telemedicine with live, interactive video and audio during this Coronavirus pandemic.     Please note that the following visit was completed using two-way, real-time interactive audio and/or video communication.  This has been done in good cookie to provide continuity of care in the best interest of the provider-patient relationship, due to the ongoing public health crisis/national emergency and because of restrictions of visitation.  There are limitations of this visit as no physical exam could be performed.  Every conscious effort was taken to allow for sufficient and adequate time.  This billing was spent on reviewing labs, medications, radiology tests and decision making.  Appropriate medical decision-making and tests are ordered as detailed in the plan of care below     Medication list did not include Xolair and Xyzal.  At last visit patient had reported 50% improvement since starting Xolair in regards to controlling her hives.    Immunizations reviewed    Today patient reports    CIU   Active or persistent symptoms: denies   70% better with xolair   Fewer hive frequency  On average 2x/week  with xolair  Much better   No angioedema   Intermittent on chest and back  ED visits or prednisone in the interim: denies   Active meds: Xolair 300 mg q 4 weeks , Xyzal  1x/day   Xolair at home     History of Present Illness           HISTORY:  Past Medical History[1]   Past Surgical History[2]   Family History[3]   Social History: Short Social Hx on File[4]     Medications (Active prior to today's visit):  Current Medications[5]    Allergies:  Allergies[6]      ROS:   Allergic/Immuno:  See hpi  Cardiovascular:  Negative for irregular heartbeat/palpitations, chest pain, edema  Constitutional:   Negative night sweats,weight loss, irritability and lethargy  ENMT:  Negative for ear drainage, hearing loss and nasal drainage  Eyes:  Negative for eye discharge and vision loss  Gastrointestinal:  Negative for abdominal pain, diarrhea and vomiting  Integumentary:  Negative for pruritus and rash  Respiratory:  Negative for cough, dyspnea and wheezing    PHYSICAL EXAM:   Constitutional: responsive, no acute distress noted  Head/Face: NC/Atraumatic  Speaking in full sentences no increased work of breathing  A&O x 3     ASSESSMENT/PLAN:   Assessment   Encounter Diagnoses   Name Primary?    Chronic idiopathic urticaria Yes    Flu vaccine need     Need for COVID-19 vaccine        Assessment & Plan  #1 chronic idiopathic urticaria  Overall much improved since starting Xolair.  Patient notes 70% improvement since starting Xolair.  Eye frequency is down dramatically.  Patient reports hives on average 1-2 times per week at best.  Using Xyzal once a day.  No ED visits or prednisone in the interim.  Happy with control at this time.  Continue with current regimen.  Reassess in 6 months.  Denies side effects with Xolair  Has not had any angioedema  Continue with Xolair 300 g every 4 weeks  Xyzal 5 mg once a day up to 4 times per day if needed    2.  Flu vaccine in the fall recommended    3.  COVID-vaccine booster recommended in the fall          Assessment & Plan            Orders This Visit:  No orders of the defined types were placed in this encounter.      Meds This Visit:  Requested Prescriptions      No prescriptions requested or ordered in this encounter       Imaging & Referrals:  None     5/19/2025  Daniel Paul MD    If medication samples were provided today, they were provided solely for patient education and training related to self administration of these medications.  Teaching, instruction and sample was provided to the patient by myself.  Teaching included  a review of potential adverse side effects as well  as potential efficacy.  Patient's questions were answered in regards to medication administration and dosing and potential side effects. Teaching was provided via the teach back method           [1] No past medical history on file.  [2] No past surgical history on file.  [3]   Family History  Problem Relation Age of Onset    Obesity Father     Other (gout) Father     Other (Other) Father     Hypertension Father     Prostate Cancer Father     Other (chronic kidney disease) Mother     Other (Other) Mother     Hypertension Mother     Heart Disorder Maternal Grandmother     Diabetes Maternal Grandmother     Heart Disorder Maternal Grandfather     Other (Alzheimers) Paternal Grandmother     Other (gout) Paternal Grandfather     Other (gout) Brother    [4]   Social History  Socioeconomic History    Marital status:    Tobacco Use    Smoking status: Never     Passive exposure: Never    Smokeless tobacco: Never   Vaping Use    Vaping status: Never Used   Substance and Sexual Activity    Alcohol use: Yes    Drug use: Never    Sexual activity: Yes     Partners: Female   [5]   Current Outpatient Medications   Medication Sig Dispense Refill    Omalizumab (XOLAIR) 150 MG/ML Subcutaneous Solution Prefilled Syringe Inject 300 mg into the skin every 28 days. 6 mL 0    levocetirizine 5 MG Oral Tab Take 1 tablet (5 mg total) by mouth every evening. 30 tablet 5    tretinoin 0.025 % External Cream       triamcinolone 0.1 % External Cream APPLY TO THE ABDOMEN, FLANK, AND BACK TWICE DAILY AS NEEDED     [6] No Known Allergies

## 2025-05-29 ENCOUNTER — PATIENT MESSAGE (OUTPATIENT)
Dept: ALLERGY | Facility: CLINIC | Age: 35
End: 2025-05-29

## 2025-05-30 ENCOUNTER — TELEPHONE (OUTPATIENT)
Dept: ALLERGY | Facility: CLINIC | Age: 35
End: 2025-05-30

## 2025-05-30 NOTE — TELEPHONE ENCOUNTER
[facility] contacted at [phone].     Automated message states that Xolair prior authorization form will be faxed to [phone].     Await fax.

## 2025-05-30 NOTE — TELEPHONE ENCOUNTER
[employer] Xolair prior authorization form received and completed for Chronic Spontaneous Urticaria diagnosis.     Completed prior authorization form alone with copy of 11/14/2025 and 5/19/2025   Allergy Physician Visit Note faxed to [employer] at [phone].     Await fax confirmation.

## 2025-06-05 NOTE — TELEPHONE ENCOUNTER
[facility] contacted at [phone].     Automated system reports Xolair prior authorization approved as below.    Xolair 150 mg/mL prefilled syringe    Effective Dates:  4/30/2025 - 5/30/2026    PA#: [id number]    Representative, Michelle, spoken with over the telephone.     RN requested Xolair Prior Authorization Approval Letter be faxed to physician's office.     [facility] contacted at [phone].     RN spoke with pharmacy Inessa chavez.     Jose De Jesus informed that prescription Xolair prior authorization has been approved.  She notes that medication did go through insurance as covered.     See [employer] message sent to patient as below.     You  Stacy Salvador and proxy (Demi Salvador)Just now (12:02 PM)     HP  Hello April,      [facility] [employer] approve the Xolair prior authorization.       I have notified [facility] of the approval.      Regards,     Shannon GHOTRA RN

## 2025-06-06 NOTE — TELEPHONE ENCOUNTER
Patient contacted via telephone and informed that prior authorization for Xolair was approved.  [facility] was informed of approval.       Patient verbalized understanding of information.

## 2025-06-13 ENCOUNTER — OFFICE VISIT (OUTPATIENT)
Dept: OBGYN CLINIC | Facility: CLINIC | Age: 35
End: 2025-06-13
Payer: COMMERCIAL

## 2025-06-13 VITALS
SYSTOLIC BLOOD PRESSURE: 112 MMHG | BODY MASS INDEX: 25.18 KG/M2 | DIASTOLIC BLOOD PRESSURE: 62 MMHG | WEIGHT: 133.38 LBS | HEART RATE: 92 BPM | HEIGHT: 61 IN

## 2025-06-13 DIAGNOSIS — N93.9 ABNORMAL UTERINE BLEEDING (AUB): Primary | ICD-10-CM

## 2025-06-13 DIAGNOSIS — Z30.011 INITIATION OF ORAL CONTRACEPTION: ICD-10-CM

## 2025-06-13 PROCEDURE — 99459 PELVIC EXAMINATION: CPT | Performed by: NURSE PRACTITIONER

## 2025-06-13 PROCEDURE — 99203 OFFICE O/P NEW LOW 30 MIN: CPT | Performed by: NURSE PRACTITIONER

## 2025-06-13 RX ORDER — NORETHINDRONE ACETATE AND ETHINYL ESTRADIOL 1MG-20(21)
1 KIT ORAL DAILY
Qty: 3 EACH | Refills: 1 | Status: SHIPPED | OUTPATIENT
Start: 2025-06-13

## 2025-06-13 NOTE — PROGRESS NOTES
Subjective:  35 year old    Chief Complaint   Patient presents with    Menstrual Problem     Pt c/o prolonged mens, states that she usually tends to menstruate for about 4-8 days but has since noticed irregularities. Pt skipped mens cycle in March, April mens lasted for 21 days with a week break and began again on May 3rd until current. Pt is currently not on OCP     Pt here today with complaints of irregular menses  Pt usually has regular monthly menses. Notes that she typically has irregular bleeding about 1x per year  The prolonged bleeding episode was generally light or just spotting  Not on OCPs or hormonal medications. Just started GLP1     Sexually active with female partner - pt had egg retrieval  in [facility] and wife carried twins, now 5 months - pt has NOT been on any hormonal medications since   Thinks was told she had fibroids    Denies personal/family history VTE  Denies migraine with aura  Denies smoking    Review of Systems:  Pertinent items are noted in the HPI.    Objective:  /62   Pulse 92   Ht 61\"   Wt 133 lb 6 oz (60.5 kg)   LMP 2025 (Exact Date)       Physical Examination:  General appearance: Well dressed, well nourished in no apparent distress  Neurologic/Psychiatric: Alert and oriented to person, place and time, mood normal, affect appropriate  Abdomen: Soft, non-tender, non-distended, no masses, no hepatosplenomegaly, no hernias, no inguinal lymphadenopathy  Pelvic:    External genitalia- Normal, Bartholin's, urethra, skeins glands normal   Vagina- No vaginal lesions, + moderate menstrual blood   Cervix- No lesions, long/closed, no cervical motion tenderness   Uterus- Normal, non-tender, no masses   Adnexa-  Non-tender, no masses    Assessment/Plan:      Diagnoses and all orders for this visit:    Abnormal uterine bleeding (AUB)  -     US PELVIS (TRANSABDOMINAL AND TRANSVAGINAL) (CPT=76856/10784); Future  - treatment to be based on US results  - discussed  treatment options and 2/2 anemia, pt desires to start OCP  -     Norethin Ace-Eth Estrad-FE 1-20 MG-MCG Oral Tab; Take 1 tablet by mouth daily.  - to follow up with new/worsening symptoms or no improvement    Initiation of oral contraception  - no contraindications      - Norethin Ace-Eth Estrad-FE 1-20 MG-MCG Oral Tab; Take 1 tablet by mouth daily.  - VTE aware          Return in about 6 months (around 12/13/2025) for annual well woman exam.

## 2025-06-13 NOTE — PATIENT INSTRUCTIONS
Instructions for Birth Control Pill Use    The birth control pill works primarily by blocking ovulation (release of an egg).  If there is no egg to meet the sperm, pregnancy cannot occur.  The pill also works by making cervical mucous thick and unreceptive to sperm, slowing tubal function which has to move the egg down the tube to meet the sperm.    For women who follow these directions carefully, the pill is an effective reversible contraceptive currently available.    Starting birth control pills for the first time  1). Choose a backup method of birth control (such as condoms, diaphragm or foam) to use with your first pack of pills because the pill may not fully protect you from pregnancy during the first two weeks.  Keep this backup method handy and use it in case you:  Run out of pills  Forget to take your pill  Discontinue pill use  The use of condoms is ALWAYS encouraged to protect against sexually transmitted diseases, if applicable.   2). There are several ways to start taking your pills. Use one of the following approaches:  First day of period start: Start your first pack of pills on the day of your period. No back-up method is required  Sunday start: Start your first pack of pills on the first Sunday after your period begins.  This will result in your menses almost always beginning on a Tuesday or a Wednesday every 4 weeks.  You will need a backup method for 14 days.  Quick Start: Start immediately if pregnancy is excluded. You will need a back-up method for 14 days.  Quick start will cause your period to be irregular.  3). Take one pill a day until you finish the pack.   If you are using a 28-day pack, begin a new pack immediately. Skip no days between packages  If you are using a 21-day pack, stop taking pills for 1 week and then start your new pack   4). Try to associate taking your pill with something you do around the same time every day, like brushing your teeth in the morning, eating a meal or  going to bed.  Establishing a routine will make it easier for you to remember. The pill works best if you take it about the same time every day.    Continuing on the pills ~ What if……  1). If you have bleeding between periods  This is a very common side effect of birth control pills for the first 3 months.  Spotting (light bleeding between periods) can also occur if you miss a pill.  Call the doctor’s office for advice if bleeding is heavier than 1 pad an hour or the bleeding between periods last for more than 3 months  2). If you have nausea or vomiting  Nausea and vomiting may occur during the first few months of taking birth control pills.  Sometimes by changing the time of the day when you take the pill will help.  Try switching to dinner time or before bed.  If you had episodes of vomiting within 2 hours of taking your pill, please use a back-up plan for the rest of the cycle because your body may not absorbed the pill.  Contact your doctor’s office if you have persistent nausea and vomiting.  3). If you miss your period  It is not uncommon for your periods to become lighter while taking birth control pills or miss you period all together.  If you missed any pills in the pack prior to this occurring, you should take a home pregnancy test prior to starting a new pack.  4). If you forget your pills for a day or two follow the instructions below:  If you miss a pill, take the forgotten one (yesterday’s pill) as soon as you remember it and take today’s pill at the regular time.  Although you probably will not become pregnant, use a back-up method until your next period.  If you miss two pills in a row, take two pills as soon as you remember and two pills the next day.  You may have some spotting and nausea.  Please use a back-up method until your next period.  If you miss three or more pills in a row, do not take all 3 pills at once.  If you are in the third week of pills, finish the pack and skip the inactive  pills and start a new pack.  Start your backup method of birth control immediately.  You are at risk for becoming pregnant if you do not use a backup contraception.    Remember, missed pills may cause you to start spotting or bleeding for about 7 days.    5). If you experience breast soreness, mild headaches, mild edema (swelling)  These symptoms are usually mild and will improve after being on the pill for 3 or more months.  If any of these symptoms are severe or persist more than 3 months, you should contact our office.  6). If you experience mood swings or changes  Usually, after you adjust to the hormones, these symptoms will improve.  If at any time these symptoms are severe or persistent, you should contact our office.    7). If your doctor has you on continuous pills (No 7 day break after 21 days of active pills) in order to suppress your menses because of endometriosis or premenstrual syndrome, you will likely have break through bleeding.  If the spotting persists through more than 3 packs of pills, contact your doctor to confirm that you should stay on that brand of pills    8). If you need to take any other medications, including antibiotics, check with the pharmacist to see if there will be any interaction or if it will make your birth control pill less effective.      When to contact your provider  If you are having severe abdominal pain  Chest pain and shortness of breath  Severe Headaches  Blurred Vision or Vision Loss  Severe leg pain- calf or thigh    If you have additional questions or concerns, please call us at [phone]

## 2025-06-14 ENCOUNTER — LAB ENCOUNTER (OUTPATIENT)
Dept: LAB | Age: 35
End: 2025-06-14
Attending: FAMILY MEDICINE
Payer: COMMERCIAL

## 2025-06-14 DIAGNOSIS — Z00.00 LABORATORY EXAMINATION ORDERED AS PART OF A ROUTINE GENERAL MEDICAL EXAMINATION: ICD-10-CM

## 2025-06-14 DIAGNOSIS — D50.9 IRON DEFICIENCY ANEMIA, UNSPECIFIED IRON DEFICIENCY ANEMIA TYPE: ICD-10-CM

## 2025-06-14 LAB
ALBUMIN SERPL-MCNC: 4.7 G/DL (ref 3.2–4.8)
ALBUMIN/GLOB SERPL: 1.7 {RATIO} (ref 1–2)
ALP LIVER SERPL-CCNC: 55 U/L (ref 37–98)
ALT SERPL-CCNC: 14 U/L (ref 10–49)
ANION GAP SERPL CALC-SCNC: 9 MMOL/L (ref 0–18)
AST SERPL-CCNC: 20 U/L (ref ?–34)
BASOPHILS # BLD AUTO: 0.04 X10(3) UL (ref 0–0.2)
BASOPHILS NFR BLD AUTO: 0.6 %
BILIRUB SERPL-MCNC: 0.3 MG/DL (ref 0.3–1.2)
BUN BLD-MCNC: 10 MG/DL (ref 9–23)
CALCIUM BLD-MCNC: 9.6 MG/DL (ref 8.7–10.6)
CHLORIDE SERPL-SCNC: 104 MMOL/L (ref 98–112)
CHOLEST SERPL-MCNC: 193 MG/DL (ref ?–200)
CO2 SERPL-SCNC: 28 MMOL/L (ref 21–32)
CREAT BLD-MCNC: 0.66 MG/DL (ref 0.55–1.02)
DEPRECATED HBV CORE AB SER IA-ACNC: 17 NG/ML (ref 50–306)
EGFRCR SERPLBLD CKD-EPI 2021: 116 ML/MIN/1.73M2 (ref 60–?)
EOSINOPHIL # BLD AUTO: 0.08 X10(3) UL (ref 0–0.7)
EOSINOPHIL NFR BLD AUTO: 1.2 %
ERYTHROCYTE [DISTWIDTH] IN BLOOD BY AUTOMATED COUNT: 13.4 %
FASTING PATIENT LIPID ANSWER: YES
FASTING STATUS PATIENT QL REPORTED: YES
GLOBULIN PLAS-MCNC: 2.7 G/DL (ref 2–3.5)
GLUCOSE BLD-MCNC: 79 MG/DL (ref 70–99)
HCT VFR BLD AUTO: 36.5 % (ref 35–48)
HDLC SERPL-MCNC: 45 MG/DL (ref 40–59)
HGB BLD-MCNC: 12.1 G/DL (ref 12–16)
IMM GRANULOCYTES # BLD AUTO: 0.03 X10(3) UL (ref 0–1)
IMM GRANULOCYTES NFR BLD: 0.5 %
IRON SATN MFR SERPL: 9 % (ref 15–50)
IRON SERPL-MCNC: 36 UG/DL (ref 50–170)
LDLC SERPL CALC-MCNC: 126 MG/DL (ref ?–100)
LYMPHOCYTES # BLD AUTO: 1.47 X10(3) UL (ref 1–4)
LYMPHOCYTES NFR BLD AUTO: 22.5 %
MCH RBC QN AUTO: 29.5 PG (ref 26–34)
MCHC RBC AUTO-ENTMCNC: 33.2 G/DL (ref 31–37)
MCV RBC AUTO: 89 FL (ref 80–100)
MONOCYTES # BLD AUTO: 0.44 X10(3) UL (ref 0.1–1)
MONOCYTES NFR BLD AUTO: 6.7 %
NEUTROPHILS # BLD AUTO: 4.47 X10 (3) UL (ref 1.5–7.7)
NEUTROPHILS # BLD AUTO: 4.47 X10(3) UL (ref 1.5–7.7)
NEUTROPHILS NFR BLD AUTO: 68.5 %
NONHDLC SERPL-MCNC: 148 MG/DL (ref ?–130)
OSMOLALITY SERPL CALC.SUM OF ELEC: 290 MOSM/KG (ref 275–295)
PLATELET # BLD AUTO: 436 10(3)UL (ref 150–450)
POTASSIUM SERPL-SCNC: 3.9 MMOL/L (ref 3.5–5.1)
PROT SERPL-MCNC: 7.4 G/DL (ref 5.7–8.2)
RBC # BLD AUTO: 4.1 X10(6)UL (ref 3.8–5.3)
SODIUM SERPL-SCNC: 141 MMOL/L (ref 136–145)
TOTAL IRON BINDING CAPACITY: 396 UG/DL (ref 250–425)
TRANSFERRIN SERPL-MCNC: 305 MG/DL (ref 250–380)
TRIGL SERPL-MCNC: 123 MG/DL (ref 30–149)
TSI SER-ACNC: 0.71 UIU/ML (ref 0.55–4.78)
VLDLC SERPL CALC-MCNC: 22 MG/DL (ref 0–30)
WBC # BLD AUTO: 6.5 X10(3) UL (ref 4–11)

## 2025-06-14 PROCEDURE — 83550 IRON BINDING TEST: CPT

## 2025-06-14 PROCEDURE — 85025 COMPLETE CBC W/AUTO DIFF WBC: CPT

## 2025-06-14 PROCEDURE — 36415 COLL VENOUS BLD VENIPUNCTURE: CPT

## 2025-06-14 PROCEDURE — 83540 ASSAY OF IRON: CPT

## 2025-06-14 PROCEDURE — 80053 COMPREHEN METABOLIC PANEL: CPT

## 2025-06-14 PROCEDURE — 82728 ASSAY OF FERRITIN: CPT

## 2025-06-14 PROCEDURE — 80061 LIPID PANEL: CPT

## 2025-06-14 PROCEDURE — 84443 ASSAY THYROID STIM HORMONE: CPT

## 2025-06-25 ENCOUNTER — OFFICE VISIT (OUTPATIENT)
Dept: FAMILY MEDICINE CLINIC | Facility: CLINIC | Age: 35
End: 2025-06-25
Payer: COMMERCIAL

## 2025-06-25 VITALS
OXYGEN SATURATION: 99 % | DIASTOLIC BLOOD PRESSURE: 80 MMHG | HEIGHT: 61 IN | HEART RATE: 91 BPM | TEMPERATURE: 97 F | WEIGHT: 136 LBS | RESPIRATION RATE: 18 BRPM | BODY MASS INDEX: 25.68 KG/M2 | SYSTOLIC BLOOD PRESSURE: 118 MMHG

## 2025-06-25 DIAGNOSIS — Z00.00 PHYSICAL EXAM, ANNUAL: Primary | ICD-10-CM

## 2025-06-25 DIAGNOSIS — D50.9 IRON DEFICIENCY ANEMIA, UNSPECIFIED IRON DEFICIENCY ANEMIA TYPE: ICD-10-CM

## 2025-06-25 PROCEDURE — 99395 PREV VISIT EST AGE 18-39: CPT | Performed by: FAMILY MEDICINE

## 2025-06-25 NOTE — PATIENT INSTRUCTIONS
Healthy diet.  Stay active.   Do test for blood in the stool when you stop having spotting/bleeding.  Recheck blood test for iron and anemia 2 to 3 months after your spotting stops.  Continue iron supplement with vitamin C.    VISIT SUMMARY:  Today, you came in for your annual physical exam and to discuss your prolonged menstrual bleeding. We reviewed your symptoms, current medications, and family history. We also discussed your lifestyle and dietary habits.    YOUR PLAN:  -MENORRHAGIA: Menorrhagia means heavy or prolonged menstrual bleeding. You have been experiencing this for two months, and while birth control pills have helped reduce the bleeding, some spotting continues. We have ordered a pelvic ultrasound to investigate the cause. Please continue taking your birth control pills and monitor your bleeding pattern. Complete the current pack and start the next one to see if it helps regulate your cycle. We will also perform a stool test for occult blood and a urine test once the significant bleeding stops.    -IRON DEFICIENCY ANEMIA: Iron deficiency anemia occurs when your body doesn't have enough iron to produce adequate levels of hemoglobin, which is necessary for carrying oxygen in your blood. Your iron levels have decreased likely due to prolonged bleeding. Please continue taking your daily iron supplements with vitamin C to help with absorption. We will recheck your blood work, including a complete blood count (CBC), iron levels, and anemia panel in September or October after your bleeding has stabilized for 2-3 months.    -FAMILY HISTORY OF COLON CANCER: Having a family history of colon cancer increases your risk of developing the disease. Since your father had colon cancer, we recommend scheduling a colonoscopy at age 40, which is earlier than the standard recommendation of age 45. We will also perform a stool test for occult blood once your significant bleeding stops. If the test is negative, we will  continue monitoring; if positive, we will refer you to a gastroenterologist for further evaluation.    -HYPERLIPIDEMIA: Hyperlipidemia means having high levels of lipids (fats) in your blood, which can increase your risk of heart disease. Your LDL cholesterol is currently 126, which is borderline high but has improved from 133 in January 2024. We recommend dietary modifications to help manage your cholesterol levels. Try to reduce your intake of red meat, pork, beef, butter, egg yolks, and cheese, and increase your consumption of chicken, turkey, and fish.    -GENERAL HEALTH MAINTENANCE: Your blood pressure is 118/80, and your weight is 136 pounds. We advise losing 4-5 pounds to optimize your BMI. Your immunizations and screenings are up to date, with future recommendations provided. Schedule a colonoscopy at age 40 due to your family history of colon cancer. We recommend a tetanus booster in 2027, a flu shot in the fall, and discussing COVID vaccination as needed. Plan for shingles and pneumonia vaccinations at age 48. Continue regular exercise and dietary modifications to maintain your health.    INSTRUCTIONS:  Please follow up with the pelvic ultrasound and complete the stool test for occult blood and urine test once your significant bleeding stops. Continue t  aking your birth control pills and iron supplements as discussed. We will recheck your blood work in September or October. Schedule a colonoscopy at age 38. Maintain your current lifestyle modifications and exercise routine.    Contains text generated by Mejia

## 2025-06-25 NOTE — PROGRESS NOTES
HPI:   Stacy Salvador is a 35 year old female who presents for a complete physical exam.     The following individual(s) verbally consented to be recorded using ambient AI listening technology and understand that they can each withdraw their consent to this listening technology at any point by asking the clinician to turn off or pause the recording:    Patient name: Stacy Salvador    History of Present Illness  Stacy Salvador is a 35 year old female who presents for an annual physical exam and reporting getting evaluation of prolonged menstrual bleeding.    She has been experiencing prolonged menstrual bleeding since May, lasting for two months. The bleeding was heavy for about two weeks and then returned to a regular flow. She started taking birth control pills two weeks ago, which have helped reduce the bleeding, although some spotting persists. She is concerned about the impact of the prolonged bleeding on her iron levels, which have decreased.    Her current medications include birth control pills, which she started two weeks ago, and iron supplements, which she takes daily with vitamin C to aid absorption. She is trying to be consistent with her iron supplement intake, especially on weekends.    She has a family history of colon cancer, as her father was diagnosed with it. She has not yet completed a stool test due to ongoing bleeding.    In terms of lifestyle, she works two jobs, which makes it challenging to exercise regularly. However, she tries to walk for an hour on weekends with her mother. She is mindful of her diet, particularly in reducing carbohydrate and sweet intake, and is working on maintaining her weight.    No issues with bowel movements or urination, and no frequency or urgency in urination. She has adjusted her tea and water intake at night to manage urinary symptoms.    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    Patient has anemia iron deficiency.  Taking iron supplement  monitor.   Patient sees OB/GYN for breast and pelvic examination.    Immunization History   Administered Date(s) Administered    >=3 YRS TRI  MULTIDOSE VIAL (22287) FLU CLINIC 11/13/2023    Covid-19 Vaccine Pfizer Hans-Sucrose 30 mcg/0.3 ml 02/19/2021, 03/11/2021, 10/18/2021    Influenza 05/19/2017, 01/12/2018, 11/13/2018, 05/08/2019, 12/13/2019, 05/22/2020, 11/20/2020, 07/30/2021    MMR 05/19/2017, 07/05/2017    TDAP 05/19/2017      Wt Readings from Last 6 Encounters:   06/25/25 136 lb (61.7 kg)   06/13/25 133 lb 6 oz (60.5 kg)   03/19/25 136 lb (61.7 kg)   11/26/24 134 lb (60.8 kg)   07/20/24 133 lb (60.3 kg)   01/26/24 128 lb (58.1 kg)     Body mass index is 25.7 kg/m².     Cholesterol, Total (mg/dL)   Date Value   06/14/2025 193   01/23/2024 205 (H)   12/20/2022 206 (H)     HDL Cholesterol (mg/dL)   Date Value   06/14/2025 45   01/23/2024 52   12/20/2022 52     LDL Cholesterol (mg/dL)   Date Value   06/14/2025 126 (H)   01/23/2024 133 (H)   12/20/2022 139 (H)     AST (U/L)   Date Value   06/14/2025 20   01/23/2024 10 (L)   12/20/2022 10 (L)     ALT   Date Value   06/14/2025 14 U/L   01/23/2024      Comment:     Due to  backorder we are temporarily unable to offer hospital-based ALT testing at [facility].   If urgently needed, please order ALT test code [id number].   The new order will need a new venipuncture and will be sent to [facility] for testing.   The expected turnaround time will be within 24 hours.    12/20/2022 16 U/L      No results found for: \"GLUCOSE\"     Current Outpatient Medications   Medication Sig Dispense Refill    Norethin Ace-Eth Estrad-FE 1-20 MG-MCG Oral Tab Take 1 tablet by mouth daily. 3 each 1    Omalizumab (XOLAIR) 150 MG/ML Subcutaneous Solution Auto-injector Inject 300 mg into the skin every 28 days. 4 mL 5    levocetirizine 5 MG Oral Tab Take 1 tablet (5 mg total) by mouth every evening. 30 tablet 5    tretinoin 0.025 % External Cream       triamcinolone 0.1 % External  Cream APPLY TO THE ABDOMEN, FLANK, AND BACK TWICE DAILY AS NEEDED      Omalizumab (XOLAIR) 150 MG/ML Subcutaneous Solution Prefilled Syringe Inject 300 mg into the skin every 28 days. 6 mL 0      History reviewed. No pertinent past medical history.   History reviewed. No pertinent surgical history.   Family History   Problem Relation Age of Onset    Obesity Father     Other (gout) Father     Other (Other) Father     Hypertension Father     Prostate Cancer Father     Other (chronic kidney disease) Mother     Other (Other) Mother     Hypertension Mother     Heart Disorder Maternal Grandmother     Diabetes Maternal Grandmother     Heart Disorder Maternal Grandfather     Other (Alzheimers) Paternal Grandmother     Other (gout) Paternal Grandfather     Other (gout) Brother     Other (cervical cancer) Maternal Aunt       Social History:   Social History     Socioeconomic History    Marital status:    Tobacco Use    Smoking status: Never     Passive exposure: Never    Smokeless tobacco: Never   Vaping Use    Vaping status: Never Used   Substance and Sexual Activity    Alcohol use: Yes    Drug use: Never    Sexual activity: Yes     Partners: Female     Occ: Orthopedic office : yes. Children: none.   Exercise: three times per week.  Diet: watches calories closely     REVIEW OF SYSTEMS:   GENERAL: feels well otherwise  SKIN: denies any unusual skin lesions  EYES:denies blurred vision or double vision  HEENT: denies nasal congestion, sinus pain or ST  LUNGS: denies shortness of breath with exertion  CARDIOVASCULAR: denies chest pain on exertion  GI: denies abdominal pain,denies heartburn  : denies dysuria, vaginal discharge or itching,periods  got heavier and prolonged, now on birth control pills  MUSCULOSKELETAL: denies back pain,  NEURO: denies headaches lump in the  PSYCHE: denies depression or anxiety  HEMATOLOGIC: denies hx of anemia  ENDOCRINE: denies thyroid history  ALL/ASTHMA: denies hx of allergy or  asthma    EXAM:   /80 (BP Location: Left arm, Patient Position: Sitting, Cuff Size: adult)   Pulse 91   Temp 96.8 °F (36 °C) (Temporal)   Resp 18   Ht 5' 1\" (1.549 m)   Wt 136 lb (61.7 kg)   LMP 05/03/2025   SpO2 99%   BMI 25.70 kg/m²   Body mass index is 25.7 kg/m².   GENERAL: well developed, well nourished,in no apparent distress  SKIN: no rashes,no suspicious lesions  HEENT: atraumatic, normocephalic,ears and throat are clear  EYES:PERRLA, EOMI,conjunctiva are clear  NECK: supple,no adenopathy  CHEST: no chest tenderness  BREAST: Deferred  LUNGS: clear to auscultation  CARDIO: RRR without murmur  GI: good BS's,no masses, HSM or tenderness  : Pap smear was completed this year  RECTAL: Deferred  MUSCULOSKELETAL: back is not tender,FROM of the back  EXTREMITIES: no cyanosis, clubbing or edema  NEURO: Oriented times three,cranial nerves are intact,motor and sensory are grossly intact    Results for orders placed or performed in visit on 06/14/25   CBC With Differential With Platelet    Collection Time: 06/14/25  8:30 AM   Result Value Ref Range    WBC 6.5 4.0 - 11.0 x10(3) uL    RBC 4.10 3.80 - 5.30 x10(6)uL    HGB 12.1 12.0 - 16.0 g/dL    HCT 36.5 35.0 - 48.0 %    .0 150.0 - 450.0 10(3)uL    MCV 89.0 80.0 - 100.0 fL    MCH 29.5 26.0 - 34.0 pg    MCHC 33.2 31.0 - 37.0 g/dL    RDW 13.4 %    Neutrophil Absolute Prelim 4.47 1.50 - 7.70 x10 (3) uL    Neutrophil Absolute 4.47 1.50 - 7.70 x10(3) uL    Lymphocyte Absolute 1.47 1.00 - 4.00 x10(3) uL    Monocyte Absolute 0.44 0.10 - 1.00 x10(3) uL    Eosinophil Absolute 0.08 0.00 - 0.70 x10(3) uL    Basophil Absolute 0.04 0.00 - 0.20 x10(3) uL    Immature Granulocyte Absolute 0.03 0.00 - 1.00 x10(3) uL    Neutrophil % 68.5 %    Lymphocyte % 22.5 %    Monocyte % 6.7 %    Eosinophil % 1.2 %    Basophil % 0.6 %    Immature Granulocyte % 0.5 %   Comp Metabolic Panel (14)    Collection Time: 06/14/25  8:30 AM   Result Value Ref Range    Glucose 79 70 - 99  mg/dL    Sodium 141 136 - 145 mmol/L    Potassium 3.9 3.5 - 5.1 mmol/L    Chloride 104 98 - 112 mmol/L    CO2 28.0 21.0 - 32.0 mmol/L    Anion Gap 9 0 - 18 mmol/L    BUN 10 9 - 23 mg/dL    Creatinine 0.66 0.55 - 1.02 mg/dL    Calcium, Total 9.6 8.7 - 10.6 mg/dL    Calculated Osmolality 290 275 - 295 mOsm/kg    eGFR-Cr 116 >=60 mL/min/1.73m2    AST 20 <34 U/L    ALT 14 10 - 49 U/L    Alkaline Phosphatase 55 37 - 98 U/L    Bilirubin, Total 0.3 0.3 - 1.2 mg/dL    Total Protein 7.4 5.7 - 8.2 g/dL    Albumin 4.7 3.2 - 4.8 g/dL    Globulin  2.7 2.0 - 3.5 g/dL    A/G Ratio 1.7 1.0 - 2.0    Patient Fasting for CMP? Yes    Lipid Panel    Collection Time: 06/14/25  8:30 AM   Result Value Ref Range    Cholesterol, Total 193 <200 mg/dL    HDL Cholesterol 45 40 - 59 mg/dL    Triglycerides 123 30 - 149 mg/dL    LDL Cholesterol 126 (H) <100 mg/dL    VLDL 22 0 - 30 mg/dL    Non HDL Chol 148 (H) <130 mg/dL    Patient Fasting for Lipid? Yes    TSH W Reflex To Free T4    Collection Time: 06/14/25  8:30 AM   Result Value Ref Range    TSH 0.707 0.550 - 4.780 uIU/mL   Ferritin    Collection Time: 06/14/25  8:30 AM   Result Value Ref Range    Ferritin 17 (L) 50 - 306 ng/mL   Iron And Tibc    Collection Time: 06/14/25  8:30 AM   Result Value Ref Range    Iron 36 (L) 50 - 170 ug/dL    Transferrin 305 250 - 380 mg/dL    Total Iron Binding Capacity 396 250 - 425 ug/dL    % Saturation 9 (L) 15 - 50 %      ASSESSMENT AND PLAN:   Stacy Salvador is a 35 year old female who presents for a complete physical exam.   Encounter Diagnoses   Name Primary?    Physical exam, annual Yes    Iron deficiency anemia, unspecified iron deficiency anemia type        Orders Placed This Encounter   Procedures    Iron And Tibc    Ferritin    CBC W Differential W Platelet [E]       Meds & Refills for this Visit:  Requested Prescriptions      No prescriptions requested or ordered in this encounter   Healthy diet.  Stay active.   Do test for blood in the stool when  you stop having spotting/bleeding.  Recheck blood test for iron and anemia 2 to 3 months after your spotting stops.  Continue iron supplement with vitamin C.    VISIT SUMMARY:  Today, you came in for your annual physical exam and to discuss your prolonged menstrual bleeding. We reviewed your symptoms, current medications, and family history. We also discussed your lifestyle and dietary habits.    YOUR PLAN:  -MENORRHAGIA: Menorrhagia means heavy or prolonged menstrual bleeding. You have been experiencing this for two months, and while birth control pills have helped reduce the bleeding, some spotting continues. We have ordered a pelvic ultrasound to investigate the cause. Please continue taking your birth control pills and monitor your bleeding pattern. Complete the current pack and start the next one to see if it helps regulate your cycle. We will also perform a stool test for occult blood and a urine test once the significant bleeding stops.    -IRON DEFICIENCY ANEMIA: Iron deficiency anemia occurs when your body doesn't have enough iron to produce adequate levels of hemoglobin, which is necessary for carrying oxygen in your blood. Your iron levels have decreased likely due to prolonged bleeding. Please continue taking your daily iron supplements with vitamin C to help with absorption. We will recheck your blood work, including a complete blood count (CBC), iron levels, and anemia panel in September or October after your bleeding has stabilized for 2-3 months.    -FAMILY HISTORY OF COLON CANCER: Having a family history of colon cancer increases your risk of developing the disease. Since your father had colon cancer, we recommend scheduling a colonoscopy at age 40, which is earlier than the standard recommendation of age 45. We will also perform a stool test for occult blood once your significant bleeding stops. If the test is negative, we will continue monitoring; if positive, we will refer you to a  gastroenterologist for further evaluation.    -HYPERLIPIDEMIA: Hyperlipidemia means having high levels of lipids (fats) in your blood, which can increase your risk of heart disease. Your LDL cholesterol is currently 126, which is borderline high but has improved from 133 in January 2024. We recommend dietary modifications to help manage your cholesterol levels. Try to reduce your intake of red meat, pork, beef, butter, egg yolks, and cheese, and increase your consumption of chicken, turkey, and fish.    -GENERAL HEALTH MAINTENANCE: Your blood pressure is 118/80, and your weight is 136 pounds. We advise losing 4-5 pounds to optimize your BMI. Your immunizations and screenings are up to date, with future recommendations provided. Schedule a colonoscopy at age 38 due to your family history of colon cancer. We recommend a tetanus booster in 2027, a flu shot in the fall, and discussing COVID vaccination as needed. Plan for shingles and pneumonia vaccinations at age 48. Continue regular exercise and dietary modifications to maintain your health.    INSTRUCTIONS:  Please follow up with the pelvic ultrasound and complete the stool test for occult blood and urine test once your significant bleeding stops. Continue t  aking your birth control pills and iron supplements as discussed. We will recheck your blood work in September or October. Schedule a colonoscopy at age 38. Maintain your current lifestyle modifications and exercise routine.    Contains text generated by Mejia       Imaging & Consults:  None   Pap and pelvic done  by gyn.  R  Self breast exam explained. Health maintenance, will check fasting Lipids, CMP, and CBC. Pt will be at 43 referred for screening colonoscopy. Pt' s weight is Body mass index is 25.7 kg/m²., recommended low carb diet and aerobic exercise 30 minutes three times weekly.  The patient indicates understanding of these issues and agrees to the plan.  The patient is asked to return for CPX in 1  year.

## 2025-07-10 ENCOUNTER — PATIENT MESSAGE (OUTPATIENT)
Dept: OBGYN CLINIC | Facility: CLINIC | Age: 35
End: 2025-07-10

## 2025-07-14 NOTE — TELEPHONE ENCOUNTER
6/13/25- last seen for AUB  Pelvic US  Return in about 6 months (around 12/13/2025) for annual well woman exam   Labs managed by PCP, orders placed for stool test to r/o occult bleeding.

## (undated) NOTE — LETTER
02/08/23 April [facility]  [address]      Dear April,    Our records indicate that you have outstanding lab work and or testing that was ordered for you and has not yet been completed:  Orders Placed This Encounter      [facility]UC[facility] 2D+3D DIAGNOSTIC [facility] RIGHT (CPT=[id number])    To provide you with the best possible care, please complete these orders at your earliest convenience. If you have recently completed these orders please disregard this letter. If you have any questions please call the office at Dept: [phone].      Thank you,   Carolyn 26